# Patient Record
Sex: FEMALE | Race: WHITE
[De-identification: names, ages, dates, MRNs, and addresses within clinical notes are randomized per-mention and may not be internally consistent; named-entity substitution may affect disease eponyms.]

---

## 2017-05-16 ENCOUNTER — HOSPITAL ENCOUNTER (INPATIENT)
Dept: HOSPITAL 23 - P2L | Age: 37
LOS: 2 days | Discharge: HOME | DRG: 885 | End: 2017-05-18
Attending: PSYCHIATRY & NEUROLOGY | Admitting: PSYCHIATRY & NEUROLOGY
Payer: COMMERCIAL

## 2017-05-16 DIAGNOSIS — F40.01: ICD-10-CM

## 2017-05-16 DIAGNOSIS — R45.851: ICD-10-CM

## 2017-05-16 DIAGNOSIS — Z81.8: ICD-10-CM

## 2017-05-16 DIAGNOSIS — J45.909: ICD-10-CM

## 2017-05-16 DIAGNOSIS — F41.9: ICD-10-CM

## 2017-05-16 DIAGNOSIS — F33.2: Primary | ICD-10-CM

## 2017-05-16 DIAGNOSIS — M79.7: ICD-10-CM

## 2017-05-17 LAB
BARBITURATES UR QL SCN: 1.4 MG/DL (ref 0.2–2)
BARBITURATES UR QL SCN: 4.5 G/DL (ref 3.5–5)
BARBITURATES: (no result)
BASOPHIL#: 0 X10E3 (ref 0–0.3)
BASOPHIL%: 0.7 % (ref 0–2.5)
BENZODIAZ UR QL SCN: 18 U/L (ref 10–42)
BENZODIAZ UR QL SCN: 25 U/L (ref 10–40)
BENZODIAZEPINES: (no result)
BLOOD UREA NITROGEN: 10 MG/DL (ref 9–23)
BUN/CREATININE RATIO: 11.11
BZE UR QL SCN: 54 U/L (ref 32–92)
CALCIUM SERUM: 9.6 MG/DL (ref 8.4–10.2)
CK MB SERPL-RTO: 12.8 % (ref 11–15.5)
CK MB SERPL-RTO: 33.6 G/DL (ref 30–36)
COCAINE: (no result)
CREATININE SERUM: 0.9 MG/DL (ref 0.6–1.4)
DIFF IND: YES
DX ICD CODE: (no result)
DX ICD CODE: (no result)
EOSINOPHIL#: 0.2 X10E3 (ref 0–0.7)
EOSINOPHIL%: 3.3 % (ref 0–7)
EOSINOPHIL: 4 % (ref 0–7)
GLOM FILT RATE ESTIMATED: 82.3 ML/MIN (ref 60–?)
GLUCOSE FASTING: 84 MG/DL (ref 70–110)
HEMATOCRIT: 44.9 % (ref 35–45)
HEMOGLOBIN: 15.1 GM/DL (ref 12–16)
KETONES UR QL: 105 MMOL/L (ref 100–111)
KETONES UR QL: 23 MMOL/L (ref 22–31)
LYMPHOCYTE#: 3.8 X10E3 (ref 1–3.5)
LYMPHOCYTE%: 55.6 % (ref 17–45)
LYMPHOCYTE: 61 % (ref 17–50)
MEAN CELL VOLUME: 92.5 FL (ref 83–96)
MEAN CORPUSCULAR HEMOGLOBIN: 31.1 PG (ref 28–34)
MEAN PLATELET VOLUME: 10 FL (ref 6.5–11.5)
MONOCYTE#: 0.8 X10E3 (ref 0–1)
MONOCYTE%: 11.5 % (ref 3–12)
MONOCYTE: 13 % (ref 3–12)
NEUTROPHIL#: 2 X10E3 (ref 1.5–7.1)
NEUTROPHIL%: 28.9 % (ref 40–75)
NEUTROPHIL: 22 % (ref 40–75)
OPIATES: (no result)
PLATELET COUNT: 265 X10E3 (ref 140–420)
PLATELET ESTIMATE: NORMAL
POTASSIUM: 3.9 MMOL/L (ref 3.5–5.1)
PROTEIN TOTAL SERUM: 7 G/DL (ref 6–8.3)
RED BLOOD COUNT: 4.85 X10E (ref 3.9–5.3)
SODIUM: 140 MMOL/L (ref 135–145)
TRICYCLIC ANTIDEPRESSANTS: (no result)
U METHADONE: (no result)
URBCS1 AUWI: (no result) /[HPF] (ref 0–2)
URINE AMORPHOUS SEDIMENT: (no result)
URINE APPEARANCE: (no result)
URINE BACTERIA AUWI: (no result)
URINE BILIRUBIN: (no result)
URINE BLOOD: (no result)
URINE COLOR: YELLOW
URINE CRYSTALS: (no result) /[HPF]
URINE GLUCOSE: (no result) MG/DL
URINE KETONE: (no result)
URINE LEUKOCYTE ESTERASE: (no result)
URINE MUCUS: PRESENT
URINE NITRATE: (no result)
URINE PH: 5 (ref 5–8)
URINE PROTEIN: (no result)
URINE SOURCE: (no result)
URINE SPECIFIC GRAVITY: 1.02 (ref 1–1.03)
URINE SQUAMOUS EPITHELIAL CELL: (no result) /[HPF]
URINE UROBILINOGEN: (no result) MG/DL
UWBCS1 AUWI: (no result) (ref 0–5)
WHITE BLOOD COUNT: 6.8 X10E3 (ref 4–10.5)

## 2023-01-12 ENCOUNTER — APPOINTMENT (OUTPATIENT)
Dept: CT IMAGING | Facility: HOSPITAL | Age: 43
End: 2023-01-12
Payer: COMMERCIAL

## 2023-01-12 ENCOUNTER — HOSPITAL ENCOUNTER (EMERGENCY)
Facility: HOSPITAL | Age: 43
Discharge: LEFT BEFORE DISCHARGE | End: 2023-01-12
Attending: EMERGENCY MEDICINE | Admitting: EMERGENCY MEDICINE
Payer: COMMERCIAL

## 2023-01-12 VITALS
BODY MASS INDEX: 29.23 KG/M2 | DIASTOLIC BLOOD PRESSURE: 62 MMHG | HEIGHT: 63 IN | WEIGHT: 165 LBS | TEMPERATURE: 97.7 F | SYSTOLIC BLOOD PRESSURE: 109 MMHG | OXYGEN SATURATION: 95 % | RESPIRATION RATE: 18 BRPM | HEART RATE: 89 BPM

## 2023-01-12 DIAGNOSIS — E87.6 HYPOKALEMIA: ICD-10-CM
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
## 2023-01-12 DIAGNOSIS — M54.2 NECK PAIN, ACUTE: ICD-10-CM

## 2023-01-12 LAB
ALBUMIN SERPL-MCNC: 4.3 G/DL (ref 3.5–5.2)
ALBUMIN/GLOB SERPL: 2.2 G/DL
ALP SERPL-CCNC: 65 U/L (ref 39–117)
ALT SERPL W P-5'-P-CCNC: 13 U/L (ref 1–33)
ANION GAP SERPL CALCULATED.3IONS-SCNC: 12 MMOL/L (ref 5–15)
AST SERPL-CCNC: 13 U/L (ref 1–32)
BASOPHILS # BLD AUTO: 0.03 10*3/MM3 (ref 0–0.2)
BASOPHILS NFR BLD AUTO: 0.5 % (ref 0–1.5)
BILIRUB SERPL-MCNC: 0.8 MG/DL (ref 0–1.2)
BUN SERPL-MCNC: 12 MG/DL (ref 6–20)
BUN/CREAT SERPL: 14.3 (ref 7–25)
CALCIUM SPEC-SCNC: 8.7 MG/DL (ref 8.6–10.5)
CHLORIDE SERPL-SCNC: 108 MMOL/L (ref 98–107)
CO2 SERPL-SCNC: 20 MMOL/L (ref 22–29)
CREAT SERPL-MCNC: 0.84 MG/DL (ref 0.57–1)
DEPRECATED RDW RBC AUTO: 39 FL (ref 37–54)
EGFRCR SERPLBLD CKD-EPI 2021: 89.1 ML/MIN/1.73
EOSINOPHIL # BLD AUTO: 0.03 10*3/MM3 (ref 0–0.4)
EOSINOPHIL NFR BLD AUTO: 0.5 % (ref 0.3–6.2)
ERYTHROCYTE [DISTWIDTH] IN BLOOD BY AUTOMATED COUNT: 12.1 % (ref 12.3–15.4)
GLOBULIN UR ELPH-MCNC: 2 GM/DL
GLUCOSE SERPL-MCNC: 86 MG/DL (ref 65–99)
HCG SERPL QL: NEGATIVE
HCT VFR BLD AUTO: 36.3 % (ref 34–46.6)
HGB BLD-MCNC: 12.7 G/DL (ref 12–15.9)
IMM GRANULOCYTES # BLD AUTO: 0.02 10*3/MM3 (ref 0–0.05)
IMM GRANULOCYTES NFR BLD AUTO: 0.4 % (ref 0–0.5)
LYMPHOCYTES # BLD AUTO: 1.49 10*3/MM3 (ref 0.7–3.1)
LYMPHOCYTES NFR BLD AUTO: 26.3 % (ref 19.6–45.3)
MCH RBC QN AUTO: 30.4 PG (ref 26.6–33)
MCHC RBC AUTO-ENTMCNC: 35 G/DL (ref 31.5–35.7)
MCV RBC AUTO: 86.8 FL (ref 79–97)
MONOCYTES # BLD AUTO: 0.96 10*3/MM3 (ref 0.1–0.9)
MONOCYTES NFR BLD AUTO: 17 % (ref 5–12)
NEUTROPHILS NFR BLD AUTO: 3.13 10*3/MM3 (ref 1.7–7)
NEUTROPHILS NFR BLD AUTO: 55.3 % (ref 42.7–76)
NRBC BLD AUTO-RTO: 0 /100 WBC (ref 0–0.2)
PLATELET # BLD AUTO: 256 10*3/MM3 (ref 140–450)
PMV BLD AUTO: 10.6 FL (ref 6–12)
POTASSIUM SERPL-SCNC: 3.1 MMOL/L (ref 3.5–5.2)
PROT SERPL-MCNC: 6.3 G/DL (ref 6–8.5)
RBC # BLD AUTO: 4.18 10*6/MM3 (ref 3.77–5.28)
SODIUM SERPL-SCNC: 140 MMOL/L (ref 136–145)
WBC NRBC COR # BLD: 5.66 10*3/MM3 (ref 3.4–10.8)

## 2023-01-12 PROCEDURE — 0 IOPAMIDOL PER 1 ML: Performed by: EMERGENCY MEDICINE

## 2023-01-12 PROCEDURE — 80053 COMPREHEN METABOLIC PANEL: CPT | Performed by: PHYSICIAN ASSISTANT

## 2023-01-12 PROCEDURE — 99283 EMERGENCY DEPT VISIT LOW MDM: CPT

## 2023-01-12 PROCEDURE — 70450 CT HEAD/BRAIN W/O DYE: CPT

## 2023-01-12 PROCEDURE — 36415 COLL VENOUS BLD VENIPUNCTURE: CPT

## 2023-01-12 PROCEDURE — 84703 CHORIONIC GONADOTROPIN ASSAY: CPT | Performed by: PHYSICIAN ASSISTANT

## 2023-01-12 PROCEDURE — 70498 CT ANGIOGRAPHY NECK: CPT

## 2023-01-12 PROCEDURE — 85025 COMPLETE CBC W/AUTO DIFF WBC: CPT | Performed by: PHYSICIAN ASSISTANT

## 2023-01-12 RX ORDER — POTASSIUM CHLORIDE 750 MG/1
40 TABLET, FILM COATED, EXTENDED RELEASE ORAL ONCE
Status: DISCONTINUED | OUTPATIENT
Start: 2023-01-12 | End: 2023-01-13 | Stop reason: HOSPADM

## 2023-01-12 RX ADMIN — IOPAMIDOL 95 ML: 755 INJECTION, SOLUTION INTRAVENOUS at 21:33
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
## 2023-01-13 NOTE — NURSING NOTE
"Pt approached this RN from ED waiting room restroom at triage desk and showed both arms to indicate no IV access.  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  PINKY Rosario and MORENITA Rutherford notified of incident.  "

## 2023-01-13 NOTE — CASE MANAGEMENT/SOCIAL WORK
Spoke with patient at bedside regarding discharge plan. She states that a trusted friend is en route to hospital and she should be able to stay with him tonight. If not, she will need a place to stay and CCP can assist with finding a womens' shelter. SCOTT IsbellN RN

## 2023-01-13 NOTE — DISCHARGE INSTRUCTIONS
Although you are being discharged from the ED today, I encourage you to return for worsening symptoms. Things can, and do, change such that treatment at home with medication may not be adequate. Specifically I recommend returning for chest pain or discomfort, difficulty breathing, persistent vomiting or difficulty holding down liquids or medications, fever > 102.0 F or any other worsening or alarming symptoms.     Rest. Drink plenty of fluids.  Follow-up with your primary care provider in 1 week to have your potassium level rechecked.  Try to incorporate foods with potassium into your diet.    Follow up with primary care provider for further management and to have blood pressure rechecked.  Call [phone] to get established with a new primary care provider if you do not already have one.

## 2023-01-13 NOTE — ED NOTES
"Call received from triage nurse informing this nurse that the pt is in the lobby and has declined further treatment. Pt told PINKY Singh \"the doctor came in and gave me my results, I don't have time to stay\" IV removed by pt, PINKY Singh verifying IV's removal. CN aware. Pt did not take any of the information given to her by CCP nurse. All literature left in the room.  "

## 2024-04-04 ENCOUNTER — HOSPITAL ENCOUNTER (EMERGENCY)
Facility: HOSPITAL | Age: 44
Discharge: HOME | End: 2024-04-04
Attending: STUDENT IN AN ORGANIZED HEALTH CARE EDUCATION/TRAINING PROGRAM
Payer: COMMERCIAL

## 2024-04-04 ENCOUNTER — APPOINTMENT (OUTPATIENT)
Dept: GENERAL RADIOLOGY | Facility: HOSPITAL | Age: 44
End: 2024-04-04
Payer: COMMERCIAL

## 2024-04-04 VITALS
OXYGEN SATURATION: 99 % | TEMPERATURE: 97.6 F | HEIGHT: 63 IN | SYSTOLIC BLOOD PRESSURE: 113 MMHG | HEART RATE: 80 BPM | BODY MASS INDEX: 29.23 KG/M2 | WEIGHT: 165 LBS | RESPIRATION RATE: 16 BRPM | DIASTOLIC BLOOD PRESSURE: 73 MMHG

## 2024-04-04 DIAGNOSIS — F19.10 POLYSUBSTANCE ABUSE: ICD-10-CM

## 2024-04-04 DIAGNOSIS — Z53.21 ELOPED FROM EMERGENCY DEPARTMENT: ICD-10-CM

## 2024-04-04 DIAGNOSIS — R07.89 ATYPICAL CHEST PAIN: Primary | ICD-10-CM

## 2024-04-04 LAB
ALBUMIN SERPL-MCNC: 4.4 G/DL (ref 3.5–5.2)
ALBUMIN/GLOB SERPL: 2.1 G/DL
ALP SERPL-CCNC: 61 U/L (ref 39–117)
ALT SERPL W P-5'-P-CCNC: 11 U/L (ref 1–33)
AMPHET+METHAMPHET UR QL: POSITIVE
ANION GAP SERPL CALCULATED.3IONS-SCNC: 12.1 MMOL/L (ref 5–15)
AST SERPL-CCNC: 15 U/L (ref 1–32)
BARBITURATES UR QL SCN: NEGATIVE
BASOPHILS # BLD AUTO: 0.07 10*3/MM3 (ref 0–0.2)
BASOPHILS NFR BLD AUTO: 1.1 % (ref 0–1.5)
BENZODIAZ UR QL SCN: NEGATIVE
BILIRUB SERPL-MCNC: 1 MG/DL (ref 0–1.2)
BUN SERPL-MCNC: 14 MG/DL (ref 6–20)
BUN/CREAT SERPL: 12.7 (ref 7–25)
CALCIUM SPEC-SCNC: 8.8 MG/DL (ref 8.6–10.5)
CANNABINOIDS SERPL QL: POSITIVE
CHLORIDE SERPL-SCNC: 107 MMOL/L (ref 98–107)
CO2 SERPL-SCNC: 21.9 MMOL/L (ref 22–29)
COCAINE UR QL: NEGATIVE
CREAT SERPL-MCNC: 1.1 MG/DL (ref 0.57–1)
D DIMER PPP FEU-MCNC: <0.27 MCGFEU/ML (ref 0–0.5)
DEPRECATED RDW RBC AUTO: 41.6 FL (ref 37–54)
EGFRCR SERPLBLD CKD-EPI 2021: 64.1 ML/MIN/1.73
EOSINOPHIL # BLD AUTO: 0.11 10*3/MM3 (ref 0–0.4)
EOSINOPHIL NFR BLD AUTO: 1.7 % (ref 0.3–6.2)
ERYTHROCYTE [DISTWIDTH] IN BLOOD BY AUTOMATED COUNT: 12.2 % (ref 12.3–15.4)
FENTANYL UR-MCNC: POSITIVE NG/ML
GLOBULIN UR ELPH-MCNC: 2.1 GM/DL
GLUCOSE SERPL-MCNC: 105 MG/DL (ref 65–99)
HCG SERPL QL: NEGATIVE
HCT VFR BLD AUTO: 40.6 % (ref 34–46.6)
HGB BLD-MCNC: 13.7 G/DL (ref 12–15.9)
HOLD SPECIMEN: NORMAL
IMM GRANULOCYTES # BLD AUTO: 0.01 10*3/MM3 (ref 0–0.05)
IMM GRANULOCYTES NFR BLD AUTO: 0.2 % (ref 0–0.5)
LYMPHOCYTES # BLD AUTO: 2.47 10*3/MM3 (ref 0.7–3.1)
LYMPHOCYTES NFR BLD AUTO: 38.2 % (ref 19.6–45.3)
MAGNESIUM SERPL-MCNC: 2 MG/DL (ref 1.6–2.6)
MCH RBC QN AUTO: 31.4 PG (ref 26.6–33)
MCHC RBC AUTO-ENTMCNC: 33.7 G/DL (ref 31.5–35.7)
MCV RBC AUTO: 92.9 FL (ref 79–97)
METHADONE UR QL SCN: NEGATIVE
MONOCYTES # BLD AUTO: 0.76 10*3/MM3 (ref 0.1–0.9)
MONOCYTES NFR BLD AUTO: 11.8 % (ref 5–12)
NEUTROPHILS NFR BLD AUTO: 3.04 10*3/MM3 (ref 1.7–7)
NEUTROPHILS NFR BLD AUTO: 47 % (ref 42.7–76)
NRBC BLD AUTO-RTO: 0 /100 WBC (ref 0–0.2)
NT-PROBNP SERPL-MCNC: 129 PG/ML (ref 0–450)
OPIATES UR QL: NEGATIVE
OXYCODONE UR QL SCN: NEGATIVE
PLATELET # BLD AUTO: 313 10*3/MM3 (ref 140–450)
PMV BLD AUTO: 10.9 FL (ref 6–12)
POTASSIUM SERPL-SCNC: 3.4 MMOL/L (ref 3.5–5.2)
PROT SERPL-MCNC: 6.5 G/DL (ref 6–8.5)
QT INTERVAL: 329 MS
QTC INTERVAL: 425 MS
RBC # BLD AUTO: 4.37 10*6/MM3 (ref 3.77–5.28)
SODIUM SERPL-SCNC: 141 MMOL/L (ref 136–145)
TROPONIN T SERPL HS-MCNC: <6 NG/L
WBC NRBC COR # BLD AUTO: 6.46 10*3/MM3 (ref 3.4–10.8)
WHOLE BLOOD HOLD COAG: NORMAL
WHOLE BLOOD HOLD SPECIMEN: NORMAL

## 2024-04-04 PROCEDURE — 85379 FIBRIN DEGRADATION QUANT: CPT | Performed by: PHYSICIAN ASSISTANT

## 2024-04-04 PROCEDURE — 80307 DRUG TEST PRSMV CHEM ANLYZR: CPT | Performed by: PHYSICIAN ASSISTANT

## 2024-04-04 PROCEDURE — 93010 ELECTROCARDIOGRAM REPORT: CPT | Performed by: INTERNAL MEDICINE

## 2024-04-04 PROCEDURE — 96374 THER/PROPH/DIAG INJ IV PUSH: CPT

## 2024-04-04 PROCEDURE — 25810000003 SODIUM CHLORIDE 0.9 % SOLUTION: Performed by: PHYSICIAN ASSISTANT

## 2024-04-04 PROCEDURE — 99284 EMERGENCY DEPT VISIT MOD MDM: CPT

## 2024-04-04 PROCEDURE — 84703 CHORIONIC GONADOTROPIN ASSAY: CPT | Performed by: PHYSICIAN ASSISTANT

## 2024-04-04 PROCEDURE — 71045 X-RAY EXAM CHEST 1 VIEW: CPT

## 2024-04-04 PROCEDURE — 80053 COMPREHEN METABOLIC PANEL: CPT

## 2024-04-04 PROCEDURE — 85025 COMPLETE CBC W/AUTO DIFF WBC: CPT

## 2024-04-04 PROCEDURE — 84484 ASSAY OF TROPONIN QUANT: CPT

## 2024-04-04 PROCEDURE — 83880 ASSAY OF NATRIURETIC PEPTIDE: CPT | Performed by: PHYSICIAN ASSISTANT

## 2024-04-04 PROCEDURE — 96361 HYDRATE IV INFUSION ADD-ON: CPT

## 2024-04-04 PROCEDURE — 93005 ELECTROCARDIOGRAM TRACING: CPT

## 2024-04-04 PROCEDURE — 83735 ASSAY OF MAGNESIUM: CPT | Performed by: PHYSICIAN ASSISTANT

## 2024-04-04 RX ORDER — BUPROPION HYDROCHLORIDE 150 MG/1
150 TABLET, EXTENDED RELEASE ORAL 2 TIMES DAILY
COMMUNITY

## 2024-04-04 RX ORDER — FAMOTIDINE 10 MG/ML
20 INJECTION, SOLUTION INTRAVENOUS ONCE
Status: COMPLETED | OUTPATIENT
Start: 2024-04-04 | End: 2024-04-04

## 2024-04-04 RX ORDER — BUSPIRONE HYDROCHLORIDE 10 MG/1
10 TABLET ORAL 3 TIMES DAILY
COMMUNITY

## 2024-04-04 RX ORDER — PRAZOSIN HYDROCHLORIDE 1 MG/1
CAPSULE ORAL NIGHTLY
COMMUNITY

## 2024-04-04 RX ORDER — DEXTROAMPHETAMINE SACCHARATE, AMPHETAMINE ASPARTATE, DEXTROAMPHETAMINE SULFATE AND AMPHETAMINE SULFATE 5; 5; 5; 5 MG/1; MG/1; MG/1; MG/1
20 TABLET ORAL DAILY
COMMUNITY

## 2024-04-04 RX ORDER — SODIUM CHLORIDE 0.9 % (FLUSH) 0.9 %
10 SYRINGE (ML) INJECTION AS NEEDED
Status: DISCONTINUED | OUTPATIENT
Start: 2024-04-04 | End: 2024-04-04 | Stop reason: HOSPADM

## 2024-04-04 RX ORDER — DEXTROAMPHETAMINE SACCHARATE, AMPHETAMINE ASPARTATE MONOHYDRATE, DEXTROAMPHETAMINE SULFATE AND AMPHETAMINE SULFATE 5; 5; 5; 5 MG/1; MG/1; MG/1; MG/1
25 CAPSULE, EXTENDED RELEASE ORAL EVERY MORNING
COMMUNITY

## 2024-04-04 RX ORDER — ASPIRIN 325 MG
325 TABLET ORAL ONCE
Status: DISCONTINUED | OUTPATIENT
Start: 2024-04-04 | End: 2024-04-04

## 2024-04-04 RX ADMIN — SODIUM CHLORIDE 500 ML: 9 INJECTION, SOLUTION INTRAVENOUS at 08:53

## 2024-04-04 RX ADMIN — FAMOTIDINE 20 MG: 10 INJECTION INTRAVENOUS at 07:46

## 2024-04-04 NOTE — ED PROVIDER NOTES
EMERGENCY DEPARTMENT MD ATTESTATION NOTE    Room Number:  03/03  PCP: Shannon Lorenzana MD  Independent Historians: Patient    HPI:    Context: Alicia Haro is a 43 y.o. female with a medical history of bipolar, substance abuse, fibromyalgia who presents to the ED c/o acute chest discomfort.    Patient absconded from the emergency department prior to my evaluation.  I reviewed documentation and discussed patient's case with TORSTEN Lujan.    Patient presented for concerns of possible pregnancy as well as left-sided chest discomfort for 3 days duration.  Patient without significant cardiac history.        Review of prior external notes (non-ED) -and- Review of prior external test results outside of this encounter: Presents with internal medicine from 8/2/2023 reviewed and notable for presentation secondary to pelvic pain and right lower quadrant pain.  Plan was to obtain pelvic ultrasound, CBC, CMP, urinalysis, urine culture, vaginosis panel and recommend to follow-up with gynecology.      PHYSICAL EXAM    I have reviewed the triage vital signs and nursing notes.    ED Triage Vitals   Temp Heart Rate Resp BP SpO2   04/04/24 0801 04/04/24 0643 04/04/24 0643 04/04/24 0650 04/04/24 0643   97.6 °F (36.4 °C) (!) 133 16 133/83 99 %      Temp src Heart Rate Source Patient Position BP Location FiO2 (%)   04/04/24 0643 04/04/24 0643 -- -- --   Tympanic Monitor        MEDICATIONS GIVEN IN ER  Medications   famotidine (PEPCID) injection 20 mg (20 mg Intravenous Given 4/4/24 0746)   sodium chloride 0.9 % bolus 500 mL (0 mL Intravenous Stopped 4/4/24 1012)         ORDERS PLACED DURING THIS VISIT:  Orders Placed This Encounter   Procedures    XR Chest 1 View    [place] Draw    Comprehensive Metabolic Panel    High Sensitivity Troponin T    CBC Auto Differential    BNP    Magnesium    Urine Drug Screen - Urine, Clean Catch    D-dimer, Quantitative    hCG, Serum, Qualitative    ECG 12 Lead ED Triage Standing Order; Chest Pain     CBC & Differential    Green Top (Gel)    Lavender Top    Light Blue Top       PROGRESS, DATA ANALYSIS, CONSULTS, AND MEDICAL DECISION MAKING  All labs have been independently interpreted by me.  All radiology studies have been reviewed by me. All EKG's have been independently viewed and interpreted by me.  Discussion below represents my analysis of pertinent findings related to patient's condition, differential diagnosis, treatment plan and final disposition.    Differential diagnosis includes but is not limited to ACS, intoxication, pregnancy.    Clinical Scores:                   ED Course as of 04/04/24 1615   Thu Apr 04, 2024   0737 WBC: 6.46 [DC]   0737 Per my independent interpretation of the chest x-ray, there is no obvious pneumothorax. [DC]   0756 D-Dimer, Quant: <0.27 [DC]   0823 HS Troponin T: <6 [DC]   0917 Amphet/Methamphet, Screen(!): Positive [DC]   0917 THC Screen, Urine(!): Positive [DC]   1007 I been notified that the patient has not been seen for several minutes and is not in her room.  All of her belongings were gone.  Presumed elopement. [DC]   1615 EKG interpreted by me demonstrates sinus rhythm, rate of 100, no VA/QT prolongation, no ST elevation but diffuse nonspecific ST changes are noted. [MW]      ED Course User Index  [DC] Tigre Oneil PA  [MW] Jose Grant MD       MDM: 43-year-old female presented for evaluation of left-sided chest pain and concerns for pregnancy.  EKG reassuring.  Troponin and D-dimer negative.  Pregnancy test negative.  Prior to completion of laboratory evaluation and prior to my face-to-face evaluation patient absconded from the emergency department.      COMPLEXITY OF CARE  Admission was considered but after careful review of the patient's presentation, physical examination, diagnostic results, and response to treatment the patient may be safely discharged with outpatient follow-up.    Please note that portions of this document were completed with a voice  recognition program.    Note Disclaimer: At [facility], we believe that sharing information builds trust and better relationships. You are receiving this note because you recently visited [facility]. It is possible you will see health information before a provider has talked with you about it. This kind of information can be easy to misunderstand. To help you fully understand what it means for your health, we urge you to discuss this note with your provider.         Jose Grant MD  04/04/24 2274

## 2024-04-04 NOTE — ED PROVIDER NOTES
EMERGENCY DEPARTMENT ENCOUNTER      Room Number:  03/03  PCP: Shannon Lorenzana MD  Independent Historians: Patient  Patient Care Team:  Shannon Lorenzana MD as PCP - General       HPI:  Chief Complaint: Chest pain    A complete HPI/ROS/PMH/PSH/SH/FH are unobtainable due to: None    Chronic or social conditions impacting patient care (Social Determinants of Health): None      Context: Alicia Haro is a 43 y.o. female with a PMH significant for bipolar affective disorder, substance abuse, fibromyalgia, anxiety who presents to the ED c/o acute left-sided chest discomfort.  She reports that symptoms began 3 days ago and have been intermittent without any obvious provocative or palliative activity.  She reports that today she started feeling some mild dyspnea which is what brought her to the ER.  She also discloses concern for pregnancy and states that her last period was 2/17/2024.  Reports that she has noticed some edema in her ankles as well as increased indigestion and nausea.      Upon review of prior external notes (non-ED) -and- Review of prior external test results outside of this encounter it appears the patient was evaluated in the office with internal medicine for abdominal pain on 8/2/2023.  The patient had a normal urinalysis on 3/19/2024 and a normal troponin on 10/22/2023.      PAST MEDICAL HISTORY  Active Ambulatory Problems     Diagnosis Date Noted    No Active Ambulatory Problems     Resolved Ambulatory Problems     Diagnosis Date Noted    No Resolved Ambulatory Problems     Past Medical History:   Diagnosis Date    ADHD     Anxiety     Asthma     Bipolar 2 disorder     Depression     IBS (irritable colon syndrome)     PCOS (polycystic ovarian syndrome)          PAST SURGICAL HISTORY  History reviewed. No pertinent surgical history.      FAMILY HISTORY  Family History   Problem Relation Age of Onset    Thyroid disease Mother     Heart failure Mother     Diabetes Mother     Aneurysm Mother     Hypertension  Father     Diabetes Father          SOCIAL HISTORY  Social History     Socioeconomic History    Marital status: Single   Tobacco Use    Smoking status: Every Day     Current packs/day: 1.00     Types: Cigarettes   Substance and Sexual Activity    Alcohol use: Never    Drug use: Yes     Frequency: 2.0 times per week     Types: Marijuana    Sexual activity: Defer         ALLERGIES  Amoxicillin-pot clavulanate and Latex      REVIEW OF SYSTEMS  Included in HPI  All systems reviewed and negative except for those discussed in HPI.      PHYSICAL EXAM    I have reviewed the triage vital signs and nursing notes.    ED Triage Vitals   Temp Heart Rate Resp BP SpO2   -- 04/04/24 0643 04/04/24 0643 04/04/24 0650 04/04/24 0643    (!) 133 16 133/83 99 %      Temp src Heart Rate Source Patient Position BP Location FiO2 (%)   04/04/24 0643 04/04/24 0643 -- -- --   Tympanic Monitor          Physical Exam  Constitutional:       General: She is not in acute distress.     Appearance: She is well-developed.   HENT:      Head: Normocephalic and atraumatic.   Eyes:      General: No scleral icterus.     Conjunctiva/sclera: Conjunctivae normal.   Neck:      Trachea: No tracheal deviation.   Cardiovascular:      Rate and Rhythm: Regular rhythm. Tachycardia present.   Pulmonary:      Effort: Pulmonary effort is normal.      Breath sounds: Normal breath sounds.   Abdominal:      Palpations: Abdomen is soft.      Tenderness: There is no abdominal tenderness.   Musculoskeletal:         General: No deformity.      Cervical back: Normal range of motion.   Lymphadenopathy:      Cervical: No cervical adenopathy.   Skin:     General: Skin is warm and dry.   Neurological:      Mental Status: She is alert and oriented to person, place, and time.   Psychiatric:         Behavior: Behavior normal.         Vital signs and nursing notes reviewed.      PPE: I wore a surgical mask throughout my encounters with the pt. I performed hand hygiene on entry into the  pt room and upon exit.      LAB RESULTS  Recent Results (from the past 24 hour(s))   ECG 12 Lead ED Triage Standing Order; Chest Pain    Collection Time: 04/04/24  6:48 AM   Result Value Ref Range    QT Interval 329 ms    QTC Interval 425 ms   Comprehensive Metabolic Panel    Collection Time: 04/04/24  7:16 AM    Specimen: Blood   Result Value Ref Range    Glucose 105 (H) 65 - 99 mg/dL    BUN 14 6 - 20 mg/dL    Creatinine 1.10 (H) 0.57 - 1.00 mg/dL    Sodium 141 136 - 145 mmol/L    Potassium 3.4 (L) 3.5 - 5.2 mmol/L    Chloride 107 98 - 107 mmol/L    CO2 21.9 (L) 22.0 - 29.0 mmol/L    Calcium 8.8 8.6 - 10.5 mg/dL    Total Protein 6.5 6.0 - 8.5 g/dL    Albumin 4.4 3.5 - 5.2 g/dL    ALT (SGPT) 11 1 - 33 U/L    AST (SGOT) 15 1 - 32 U/L    Alkaline Phosphatase 61 39 - 117 U/L    Total Bilirubin 1.0 0.0 - 1.2 mg/dL    Globulin 2.1 gm/dL    A/G Ratio 2.1 g/dL    BUN/Creatinine Ratio 12.7 7.0 - 25.0    Anion Gap 12.1 5.0 - 15.0 mmol/L    eGFR 64.1 >60.0 mL/min/1.73   High Sensitivity Troponin T    Collection Time: 04/04/24  7:16 AM    Specimen: Blood   Result Value Ref Range    HS Troponin T <6 <14 ng/L   Green Top (Gel)    Collection Time: 04/04/24  7:16 AM   Result Value Ref Range    Extra Tube Hold for add-ons.    Lavender Top    Collection Time: 04/04/24  7:16 AM   Result Value Ref Range    Extra Tube hold for add-on    Light Blue Top    Collection Time: 04/04/24  7:16 AM   Result Value Ref Range    Extra Tube Hold for add-ons.    CBC Auto Differential    Collection Time: 04/04/24  7:16 AM    Specimen: Blood   Result Value Ref Range    WBC 6.46 3.40 - 10.80 10*3/mm3    RBC 4.37 3.77 - 5.28 10*6/mm3    Hemoglobin 13.7 12.0 - 15.9 g/dL    Hematocrit 40.6 34.0 - 46.6 %    MCV 92.9 79.0 - 97.0 fL    MCH 31.4 26.6 - 33.0 pg    MCHC 33.7 31.5 - 35.7 g/dL    RDW 12.2 (L) 12.3 - 15.4 %    RDW-SD 41.6 37.0 - 54.0 fl    MPV 10.9 6.0 - 12.0 fL    Platelets 313 140 - 450 10*3/mm3    Neutrophil % 47.0 42.7 - 76.0 %    Lymphocyte %  38.2 19.6 - 45.3 %    Monocyte % 11.8 5.0 - 12.0 %    Eosinophil % 1.7 0.3 - 6.2 %    Basophil % 1.1 0.0 - 1.5 %    Immature Grans % 0.2 0.0 - 0.5 %    Neutrophils, Absolute 3.04 1.70 - 7.00 10*3/mm3    Lymphocytes, Absolute 2.47 0.70 - 3.10 10*3/mm3    Monocytes, Absolute 0.76 0.10 - 0.90 10*3/mm3    Eosinophils, Absolute 0.11 0.00 - 0.40 10*3/mm3    Basophils, Absolute 0.07 0.00 - 0.20 10*3/mm3    Immature Grans, Absolute 0.01 0.00 - 0.05 10*3/mm3    nRBC 0.0 0.0 - 0.2 /100 WBC   BNP    Collection Time: 04/04/24  7:16 AM    Specimen: Blood   Result Value Ref Range    proBNP 129.0 0.0 - 450.0 pg/mL   Magnesium    Collection Time: 04/04/24  7:16 AM    Specimen: Blood   Result Value Ref Range    Magnesium 2.0 1.6 - 2.6 mg/dL   D-dimer, Quantitative    Collection Time: 04/04/24  7:16 AM    Specimen: Blood   Result Value Ref Range    D-Dimer, Quantitative <0.27 0.00 - 0.50 MCGFEU/mL   hCG, Serum, Qualitative    Collection Time: 04/04/24  7:16 AM    Specimen: Blood   Result Value Ref Range    HCG Qualitative Negative Negative   Urine Drug Screen - Urine, Clean Catch    Collection Time: 04/04/24  8:20 AM    Specimen: Urine, Clean Catch   Result Value Ref Range    Amphet/Methamphet, Screen Positive (A) Negative    Barbiturates Screen, Urine Negative Negative    Benzodiazepine Screen, Urine Negative Negative    Cocaine Screen, Urine Negative Negative    Opiate Screen Negative Negative    THC, Screen, Urine Positive (A) Negative    Methadone Screen, Urine Negative Negative    Oxycodone Screen, Urine Negative Negative    Fentanyl, Urine Positive (A) Negative         RADIOLOGY  XR Chest 1 View    Result Date: 4/4/2024  XR CHEST 1 VW-  HISTORY: Chest pain.  COMPARISON: Chest radiograph dated 2/2/2015  FINDINGS: A single view of the chest was obtained.  Support Devices:  None. Cardiac Silhouette/Mediastinum/Nehal:  The cardiac, mediastinal, and hilar contours are within normal limits. Lungs/Pleural Spaces:  The lungs and pleural  spaces are clear. Chest Wall/Diaphragm/Upper Abdomen:  The thoracic musculoskeletal structures and the upper abdomen are within normal limits.   CONCLUSION(S):   1.  No focal consolidation or effusion.  This report was finalized on 4/4/2024 7:32 AM by Dr. Jayde Delacruz M.D on Workstation: [id number]         MEDICATIONS GIVEN IN ER  Medications   famotidine (PEPCID) injection 20 mg (20 mg Intravenous Given 4/4/24 0746)   sodium chloride 0.9 % bolus 500 mL (0 mL Intravenous Stopped 4/4/24 1012)         ORDERS PLACED DURING THIS VISIT:  Orders Placed This Encounter   Procedures    XR Chest 1 View    Isabella Draw    Comprehensive Metabolic Panel    High Sensitivity Troponin T    CBC Auto Differential    BNP    Magnesium    Urine Drug Screen - Urine, Clean Catch    D-dimer, Quantitative    hCG, Serum, Qualitative    ECG 12 Lead ED Triage Standing Order; Chest Pain    CBC & Differential    Green Top (Gel)    Lavender Top    Light Blue Top         OUTPATIENT MEDICATION MANAGEMENT:  No current Epic-ordered facility-administered medications on file.     Current Outpatient Medications Ordered in Epic   Medication Sig Dispense Refill    amphetamine-dextroamphetamine (ADDERALL) 20 MG tablet Take 1 tablet by mouth Daily.      amphetamine-dextroamphetamine XR (Adderall XR) 20 MG 24 hr capsule Take 25 mg by mouth Every Morning      buPROPion SR (WELLBUTRIN SR) 150 MG 12 hr tablet Take 1 tablet by mouth 2 (Two) Times a Day.      busPIRone (BUSPAR) 10 MG tablet Take 1 tablet by mouth 3 (Three) Times a Day.      prazosin (MINIPRESS) 1 MG capsule Take  by mouth Every Night. Unknown dose             PROGRESS, DATA ANALYSIS, CONSULTS, AND MEDICAL DECISION MAKING  All labs have been independently interpreted by me.  All radiology studies have been reviewed by me. All EKG's have been independently viewed and interpreted by me.  Discussion below represents my analysis of pertinent findings related to patient's condition, differential  diagnosis, treatment plan and final disposition.    Patient presentation and evaluation most consistent with atypical sounding chest pain from unclear etiology.  Her primary concern was for pregnancy which was ruled out during this visit as well.  She unfortunately eloped prior to my discussion with her about her workup and my encouragement for close follow-up.    DIFFERENTIAL DIAGNOSIS INCLUDE BUT NOT LIMITED TO:     Differential diagnosis includes but is not limited to:  -acute coronary syndrome  -pulmonary embolism  -thoracic aortic dissection  -pneumonia  -pneumothorax  -musculoskeletal pain  -GERD  -esophageal spasm  -anxiety  -myocarditis/pericarditis  -esophageal rupture  -pancreatitis.       Clinical Scores: N/A      ED Course as of 04/05/24 0554   Thu Apr 04, 2024   0737 WBC: 6.46 [DC]   0737 Per my independent interpretation of the chest x-ray, there is no obvious pneumothorax. [DC]   0756 D-Dimer, Quant: <0.27 [DC]   0823 HS Troponin T: <6 [DC]   0917 Amphet/Methamphet, Screen(!): Positive [DC]   0917 THC Screen, Urine(!): Positive [DC]   1007 I been notified that the patient has not been seen for several minutes and is not in her room.  All of her belongings were gone.  Presumed elopement. [DC]   1615 EKG interpreted by me demonstrates sinus rhythm, rate of 100, no CT/QT prolongation, no ST elevation but diffuse nonspecific ST changes are noted. [MW]      ED Course User Index  [DC] Tigre Oneil PA  [MW] Jose Grant MD             AS OF 05:54 EDT VITALS:    BP - 113/73  HR - 80  TEMP - 97.6 °F (36.4 °C) (Oral)  O2 SATS - 99%    COMPLEXITY OF CARE  Admission was considered but after careful review of the patient's presentation, physical examination, diagnostic results, and response to treatment the patient may be safely discharged with outpatient follow-up.      DIAGNOSIS  Final diagnoses:   Atypical chest pain   Polysubstance abuse   Eloped from emergency department         DISPOSITION  ED  Disposition       ED Disposition   Eloped    Condition   --    Comment   --                Please note that portions of this document were completed with a voice recognition program.    Note Disclaimer: At [facility], we believe that sharing information builds trust and better relationships. You are receiving this note because you recently visited [facility]. It is possible you will see health information before a provider has talked with you about it. This kind of information can be easy to misunderstand. To help you fully understand what it means for your health, we urge you to discuss this note with your provider.         Tigre Oneil PA  04/05/24 0554

## 2024-04-04 NOTE — ED NOTES
Pt arrives to ED via PV from home, c/o intermittent chest pain for the last 3 days, today is affecting breathing and blurry vision. Pt is also unsure if she is pregnant as she was having sx. Pt has not taken a pregnancy test, last period was 2/17/2024.

## 2024-04-04 NOTE — ED NOTES
This RN went to check on pt in room and pt was not to be found. Pt had taken her gown off and took her monitor off. Attempted to contact pt via the phone number in the computer and that number is not in service. The pt did have an IV in place and the IV was not in the room. Provider notified, charge RN notified.

## 2024-10-28 ENCOUNTER — OFFICE VISIT (OUTPATIENT)
Dept: INTERNAL MEDICINE | Facility: CLINIC | Age: 44
End: 2024-10-28
Payer: COMMERCIAL

## 2024-10-28 ENCOUNTER — HOSPITAL ENCOUNTER (OUTPATIENT)
Facility: HOSPITAL | Age: 44
Discharge: HOME | End: 2024-10-28
Payer: COMMERCIAL

## 2024-10-28 VITALS
OXYGEN SATURATION: 98 % | SYSTOLIC BLOOD PRESSURE: 118 MMHG | WEIGHT: 161 LBS | TEMPERATURE: 98.3 F | BODY MASS INDEX: 28.53 KG/M2 | DIASTOLIC BLOOD PRESSURE: 72 MMHG | HEIGHT: 63 IN | HEART RATE: 91 BPM

## 2024-10-28 DIAGNOSIS — R94.31 ABNORMAL EKG: ICD-10-CM

## 2024-10-28 DIAGNOSIS — R73.9 ELEVATED RANDOM BLOOD GLUCOSE LEVEL: ICD-10-CM

## 2024-10-28 DIAGNOSIS — E55.9 VITAMIN D DEFICIENCY: Chronic | ICD-10-CM

## 2024-10-28 DIAGNOSIS — F32.A ANXIETY AND DEPRESSION: Chronic | ICD-10-CM

## 2024-10-28 DIAGNOSIS — R07.9 CHEST PAIN, UNSPECIFIED TYPE: ICD-10-CM

## 2024-10-28 DIAGNOSIS — G47.00 INSOMNIA, UNSPECIFIED TYPE: Chronic | ICD-10-CM

## 2024-10-28 DIAGNOSIS — F31.81 BIPOLAR 2 DISORDER: Chronic | ICD-10-CM

## 2024-10-28 DIAGNOSIS — F41.9 ANXIETY AND DEPRESSION: Chronic | ICD-10-CM

## 2024-10-28 DIAGNOSIS — L65.9 THINNING HAIR: ICD-10-CM

## 2024-10-28 DIAGNOSIS — L68.0 HIRSUTISM: Chronic | ICD-10-CM

## 2024-10-28 DIAGNOSIS — Z76.89 ENCOUNTER TO ESTABLISH CARE: Primary | ICD-10-CM

## 2024-10-28 DIAGNOSIS — M25.532 LEFT WRIST PAIN: ICD-10-CM

## 2024-10-28 DIAGNOSIS — D72.819 LEUKOPENIA, UNSPECIFIED TYPE: ICD-10-CM

## 2024-10-28 DIAGNOSIS — H04.123 DRY EYES: ICD-10-CM

## 2024-10-28 DIAGNOSIS — M25.50 ARTHRALGIA, UNSPECIFIED JOINT: ICD-10-CM

## 2024-10-28 DIAGNOSIS — R21 FACIAL RASH: ICD-10-CM

## 2024-10-28 DIAGNOSIS — E53.8 B12 DEFICIENCY: ICD-10-CM

## 2024-10-28 DIAGNOSIS — R53.82 CHRONIC FATIGUE, UNSPECIFIED: ICD-10-CM

## 2024-10-28 PROBLEM — N30.10 CHRONIC INTERSTITIAL CYSTITIS: Status: ACTIVE | Noted: 2021-03-29

## 2024-10-28 PROBLEM — K21.9 GASTROESOPHAGEAL REFLUX DISEASE: Status: ACTIVE | Noted: 2021-03-29

## 2024-10-28 PROCEDURE — 99204 OFFICE O/P NEW MOD 45 MIN: CPT

## 2024-10-28 PROCEDURE — 1125F AMNT PAIN NOTED PAIN PRSNT: CPT

## 2024-10-28 RX ORDER — IBUPROFEN 800 MG/1
TABLET, FILM COATED ORAL
COMMUNITY
Start: 2024-08-30

## 2024-10-28 RX ORDER — LAMOTRIGINE 25 MG/1
TABLET ORAL
COMMUNITY
Start: 2024-10-08

## 2024-10-28 NOTE — PROGRESS NOTES
Chief Complaint  Establish Care, Fatigue, Joint Pain, Chest Pain, Anxiety, Depression, and Wrist Pain     Subjective:      History of Present Illness {CC  Problem List  Visit  Diagnosis   Encounters  Notes  Medications  Labs  Result Review Imaging  Media :23}     Alicia Haro presents to [facility] for:      History of Present Illness     Pt is new to me today and is here to establish care.   Last PCP: Dr. Read and SEGUNDO Short  Pt was last seen in August 2024.     Pt has a hx of fibromyalgia- pt states that she was dx with this from Dr. Read. Pt has a long history of autoimmune symptoms, but states that she has never been fully tested. Pt states she has generalized joint pain, dry eyes, chronic fatigue, ,migraines, thinning hair, dry scalp and facial rash. Pt also states she has random episodes of swelling, excessive sweating, and tremors.     Peripheral neuropathy- pt states thaty she was diagnosed with this years ago and is unsure of how it started.       Bipolar/ Anxiety/ Depression: pt sees Anushka cantor at [facility]. Pt is currently on lamictal, buspar, wellbutrin, lamictal, prazosin and adderral. Adderrall is prescribed by the couch.       P broke her left wrist years ago and has been having trouble with turning this. Yesterday she hit her wrist against a doorframe and heard a pop and it has been painful and swollen since. \    Patient has a history of chest pain.  Patient states this comes intermittently, but last for a while.  Patient has been worked up on this by her last provider, and states her EKG was abnormal.  Patient states she was supposed to be sent to cardiology, but never got the call for a referral.    Patient was referred to hematology for low white blood cell counts.  Patient has an appointment in a few weeks.  Patient is going to [place] hematology.    I have reviewed patient's medical history, any new submitted information  "provided by patient or medical assistant and updated medical record.      Objective:      Physical Exam  Vitals reviewed.   Constitutional:       Appearance: Normal appearance.   HENT:      Head: Normocephalic.   Cardiovascular:      Rate and Rhythm: Normal rate and regular rhythm.      Pulses: Normal pulses.      Heart sounds: Normal heart sounds. No murmur heard.     No friction rub.   Pulmonary:      Effort: Pulmonary effort is normal.      Breath sounds: Normal breath sounds.   Musculoskeletal:      Left wrist: Swelling and tenderness present. Decreased range of motion.   Skin:     General: Skin is warm and dry.      Capillary Refill: Capillary refill takes less than 2 seconds.   Neurological:      General: No focal deficit present.      Mental Status: She is alert.   Psychiatric:         Mood and Affect: Mood normal.         Behavior: Behavior normal.        Result Review  Data Reviewed:{ Labs  Result Review  Imaging  Med Tab  Media :23}                Vital Signs:   /72 (BP Location: Left arm, Patient Position: Sitting, Cuff Size: Adult)   Pulse 91   Temp 98.3 °F (36.8 °C) (Oral)   Ht 160 cm (63\")   Wt 73 kg (161 lb)   SpO2 98%   BMI 28.52 kg/m²   Estimated body mass index is 28.52 kg/m² as calculated from the following:    Height as of this encounter: 160 cm (63\").    Weight as of this encounter: 73 kg (161 lb).        Requested Prescriptions      No prescriptions requested or ordered in this encounter       Routine medications provided by this office will also be refilled via pharmacy request.       Current Outpatient Medications:     amphetamine-dextroamphetamine (ADDERALL) 20 MG tablet, Take 1 tablet by mouth Daily., Disp: , Rfl:     amphetamine-dextroamphetamine XR (Adderall XR) 20 MG 24 hr capsule, Take 25 mg by mouth Every Morning, Disp: , Rfl:     buPROPion SR (WELLBUTRIN SR) 150 MG 12 hr tablet, Take 1 tablet by mouth 2 (Two) Times a Day., Disp: , Rfl:     busPIRone (BUSPAR) 10 MG " tablet, Take 1 tablet by mouth 3 (Three) Times a Day., Disp: , Rfl:     ibuprofen (ADVIL,MOTRIN) 800 MG tablet, , Disp: , Rfl:     lamoTRIgine (LaMICtal) 25 MG tablet, , Disp: , Rfl:     prazosin (MINIPRESS) 1 MG capsule, Take  by mouth Every Night. Unknown dose, Disp: , Rfl:      Assessment and Plan:      Assessment and Plan {CC Problem List  Visit Diagnosis  ROS  Review (Popup)  [facility]  Quality  BestPractice  Medications  SmartSets  SnapShot Encounters  Media :23}     Diagnoses and all orders for this visit:    1. Encounter to establish care (Primary)    2. B12 deficiency  -     TSH Rfx On Abnormal To Free T4  -     [facility] Comprehensive Panel  -     Sedimentation Rate  -     C-reactive protein  -     Sjogren's Antibody, Anti-SS-A / -SS-B  -     Vitamin B12    3. Chronic fatigue, unspecified  -     TSH Rfx On Abnormal To Free T4  -     [facility] Comprehensive Panel  -     Sedimentation Rate  -     C-reactive protein  -     Sjogren's Antibody, Anti-SS-A / -SS-B    4. Anxiety and depression  -     TSH Rfx On Abnormal To Free T4    5. Vitamin D deficiency  -     Vitamin D 25 hydroxy    6. Hirsutism  -     TSH Rfx On Abnormal To Free T4  -     [facility] Comprehensive Panel  -     Sedimentation Rate  -     C-reactive protein  -     Sjogren's Antibody, Anti-SS-A / -SS-B    7. Arthralgia, unspecified joint  -     TSH Rfx On Abnormal To Free T4  -     [facility] Comprehensive Panel  -     Sedimentation Rate  -     C-reactive protein  -     Sjogren's Antibody, Anti-SS-A / -SS-B    8. Dry eyes  -     TSH Rfx On Abnormal To Free T4  -     [facility] Comprehensive Panel  -     Sedimentation Rate  -     C-reactive protein  -     Sjogren's Antibody, Anti-SS-A / -SS-B    9. Facial rash  -     TSH Rfx On Abnormal To Free T4  -     [facility] Comprehensive Panel  -     Sedimentation Rate  -     C-reactive protein  -     Sjogren's Antibody, Anti-SS-A / -SS-B    10. Thinning hair  -     TSH Rfx On Abnormal To Free T4  -     [facility] Comprehensive  Panel  -     Sedimentation Rate  -     C-reactive protein  -     Sjogren's Antibody, Anti-SS-A / -SS-B    11. Elevated random blood glucose level  -     Comprehensive Metabolic Panel  -     Hemoglobin A1c    12. Leukopenia, unspecified type  -     CBC & Differential    13. Left wrist pain  -     XR Wrist 3+ View Left; Future    14. Chest pain, unspecified type  -     Ambulatory Referral to Cardiology    15. Abnormal EKG  -     Ambulatory Referral to Cardiology    16. Bipolar 2 disorder    17. Insomnia, unspecified type             No orders of the defined types were placed in this encounter.      Will get labs today and review with patient once available.  Educated patient to follow-up with hematology as previously indicated.  If all labs are normal we will still consider sending patient to rheumatology.  Educated patient to continue seeing mental health provider.  Will get x-ray of her left wrist and review results with patient once available.  Will potentially send patient to Ortho or hand surgery if needed.  Educated patient to maintain a healthy diet and daily exercise.  Patient verbalized understanding and is comfortable with the plan of care.    Follow Up {Instructions Charge Capture  Follow-up Communications :23}     Return in about 4 weeks (around 11/25/2024) for Annual physical.      Patient was given instructions and counseling regarding her condition or for health maintenance advice. Please see specific information pulled into the AVS if appropriate.    Elaina disclaimer:   Much of this encounter note is an electronic transcription/translation of spoken language to printed text. The electronic translation of spoken language may permit erroneous, or at times, nonsensical words or phrases to be inadvertently transcribed; Although I have reviewed the note for such errors, some may still exist.     Additional Patient Counseling:       There are no Patient Instructions on file for this visit.

## 2024-10-29 ENCOUNTER — TELEPHONE (OUTPATIENT)
Dept: INTERNAL MEDICINE | Facility: CLINIC | Age: 44
End: 2024-10-29
Payer: COMMERCIAL

## 2024-10-29 DIAGNOSIS — M25.50 ARTHRALGIA, UNSPECIFIED JOINT: ICD-10-CM

## 2024-10-29 DIAGNOSIS — L68.0 HIRSUTISM: ICD-10-CM

## 2024-10-29 DIAGNOSIS — R21 FACIAL RASH: ICD-10-CM

## 2024-10-29 DIAGNOSIS — L65.9 THINNING HAIR: ICD-10-CM

## 2024-10-29 DIAGNOSIS — R53.82 CHRONIC FATIGUE, UNSPECIFIED: Primary | ICD-10-CM

## 2024-10-29 DIAGNOSIS — H04.123 DRY EYES: ICD-10-CM

## 2024-10-29 LAB
25(OH)D3+25(OH)D2 SERPL-MCNC: 41.9 NG/ML (ref 30–100)
ALBUMIN SERPL-MCNC: 4.3 G/DL (ref 3.5–5.2)
ALBUMIN/GLOB SERPL: 2 G/DL
ALP SERPL-CCNC: 65 U/L (ref 39–117)
ALT SERPL-CCNC: 17 U/L (ref 1–33)
AST SERPL-CCNC: 13 U/L (ref 1–32)
BASOPHILS # BLD AUTO: 0.04 10*3/MM3 (ref 0–0.2)
BASOPHILS NFR BLD AUTO: 1 % (ref 0–1.5)
BILIRUB SERPL-MCNC: 0.9 MG/DL (ref 0–1.2)
BUN SERPL-MCNC: 11 MG/DL (ref 6–20)
BUN/CREAT SERPL: 11.7 (ref 7–25)
CALCIUM SERPL-MCNC: 9.4 MG/DL (ref 8.6–10.5)
CENTROMERE B AB SER-ACNC: <0.2 AI (ref 0–0.9)
CHLORIDE SERPL-SCNC: 105 MMOL/L (ref 98–107)
CHROMATIN AB SERPL-ACNC: <0.2 AI (ref 0–0.9)
CO2 SERPL-SCNC: 25.3 MMOL/L (ref 22–29)
CREAT SERPL-MCNC: 0.94 MG/DL (ref 0.57–1)
CRP SERPL-MCNC: <0.3 MG/DL (ref 0–0.5)
DSDNA AB SER-ACNC: <1 IU/ML (ref 0–9)
EGFRCR SERPLBLD CKD-EPI 2021: 77.4 ML/MIN/1.73
ENA JO1 AB SER-ACNC: <0.2 AI (ref 0–0.9)
ENA RNP AB SER-ACNC: <0.2 AI (ref 0–0.9)
ENA SCL70 AB SER-ACNC: <0.2 AI (ref 0–0.9)
ENA SM AB SER-ACNC: <0.2 AI (ref 0–0.9)
ENA SS-A AB SER-ACNC: <0.2 AI (ref 0–0.9)
ENA SS-B AB SER-ACNC: <0.2 AI (ref 0–0.9)
EOSINOPHIL # BLD AUTO: 0.09 10*3/MM3 (ref 0–0.4)
EOSINOPHIL NFR BLD AUTO: 2.3 % (ref 0.3–6.2)
ERYTHROCYTE [DISTWIDTH] IN BLOOD BY AUTOMATED COUNT: 12 % (ref 12.3–15.4)
ERYTHROCYTE [SEDIMENTATION RATE] IN BLOOD BY WESTERGREN METHOD: <1 MM/HR (ref 0–20)
GLOBULIN SER CALC-MCNC: 2.2 GM/DL
GLUCOSE SERPL-MCNC: 94 MG/DL (ref 65–99)
HBA1C MFR BLD: 4.7 % (ref 4.8–5.6)
HCT VFR BLD AUTO: 40.1 % (ref 34–46.6)
HGB BLD-MCNC: 13.4 G/DL (ref 12–15.9)
IMM GRANULOCYTES # BLD AUTO: 0.01 10*3/MM3 (ref 0–0.05)
IMM GRANULOCYTES NFR BLD AUTO: 0.3 % (ref 0–0.5)
LYMPHOCYTES # BLD AUTO: 1.87 10*3/MM3 (ref 0.7–3.1)
LYMPHOCYTES NFR BLD AUTO: 47.1 % (ref 19.6–45.3)
Lab: NORMAL
MCH RBC QN AUTO: 30.8 PG (ref 26.6–33)
MCHC RBC AUTO-ENTMCNC: 33.4 G/DL (ref 31.5–35.7)
MCV RBC AUTO: 92.2 FL (ref 79–97)
MONOCYTES # BLD AUTO: 0.49 10*3/MM3 (ref 0.1–0.9)
MONOCYTES NFR BLD AUTO: 12.3 % (ref 5–12)
NEUTROPHILS # BLD AUTO: 1.47 10*3/MM3 (ref 1.7–7)
NEUTROPHILS NFR BLD AUTO: 37 % (ref 42.7–76)
NRBC BLD AUTO-RTO: 0 /100 WBC (ref 0–0.2)
PLATELET # BLD AUTO: 328 10*3/MM3 (ref 140–450)
POTASSIUM SERPL-SCNC: 3.9 MMOL/L (ref 3.5–5.2)
PROT SERPL-MCNC: 6.5 G/DL (ref 6–8.5)
RBC # BLD AUTO: 4.35 10*6/MM3 (ref 3.77–5.28)
SODIUM SERPL-SCNC: 138 MMOL/L (ref 136–145)
TSH SERPL DL<=0.005 MIU/L-ACNC: 1.33 UIU/ML (ref 0.27–4.2)
VIT B12 SERPL-MCNC: 350 PG/ML (ref 211–946)
WBC # BLD AUTO: 3.97 10*3/MM3 (ref 3.4–10.8)

## 2024-10-29 NOTE — TELEPHONE ENCOUNTER
"Relay     \"Some of your blood counts were still low, although your white blood cell count was back to a normal range. Continue with your follow up to hematology. \"    \"Your rheumatology labs came back negative. I will place an order to rheumatology for further management. \"    "

## 2024-10-30 ENCOUNTER — PATIENT ROUNDING (BHMG ONLY) (OUTPATIENT)
Dept: INTERNAL MEDICINE | Facility: CLINIC | Age: 44
End: 2024-10-30
Payer: COMMERCIAL

## 2024-11-01 NOTE — TELEPHONE ENCOUNTER
Name: TahirAliica      Relationship: Self      Best Callback Number: [phone]      HUB PROVIDED THE RELAY MESSAGE FROM THE OFFICE      PATIENT: VOICED UNDERSTANDING AND HAS NO FURTHER QUESTIONS AT THIS TIME    ADDITIONAL INFORMATION: PLEASE ADVISE.

## 2024-11-08 ENCOUNTER — TELEPHONE (OUTPATIENT)
Dept: INTERNAL MEDICINE | Facility: CLINIC | Age: 44
End: 2024-11-08

## 2024-11-08 NOTE — TELEPHONE ENCOUNTER
Caller: Alicia Haro    Relationship: Self    Best call back number:     What is the best time to reach you:     Who are you requesting to speak with (clinical staff, provider,  specific staff member):     Do you know the name of the person who called:     What was the call regarding: PATIENT WANTS TO BE CALLED TO DISCUSS HER REFERRAL TO RHEUMATOLOGY     Is it okay if the provider responds through MyChart:

## 2024-12-13 ENCOUNTER — OFFICE VISIT (OUTPATIENT)
Dept: INTERNAL MEDICINE | Facility: CLINIC | Age: 44
End: 2024-12-13
Payer: COMMERCIAL

## 2024-12-13 ENCOUNTER — HOSPITAL ENCOUNTER (OUTPATIENT)
Facility: HOSPITAL | Age: 44
Discharge: HOME | End: 2024-12-13
Payer: COMMERCIAL

## 2024-12-13 VITALS
WEIGHT: 166.8 LBS | HEIGHT: 63 IN | OXYGEN SATURATION: 99 % | HEART RATE: 92 BPM | DIASTOLIC BLOOD PRESSURE: 76 MMHG | SYSTOLIC BLOOD PRESSURE: 112 MMHG | BODY MASS INDEX: 29.55 KG/M2 | TEMPERATURE: 98 F

## 2024-12-13 DIAGNOSIS — J30.2 SEASONAL ALLERGIES: ICD-10-CM

## 2024-12-13 DIAGNOSIS — Z00.00 ROUTINE GENERAL MEDICAL EXAMINATION AT A HEALTH CARE FACILITY: Primary | ICD-10-CM

## 2024-12-13 DIAGNOSIS — H04.123 DRY EYES: Chronic | ICD-10-CM

## 2024-12-13 DIAGNOSIS — R53.82 CHRONIC FATIGUE, UNSPECIFIED: Chronic | ICD-10-CM

## 2024-12-13 DIAGNOSIS — G43.E01 CHRONIC MIGRAINE WITH AURA AND WITH STATUS MIGRAINOSUS, NOT INTRACTABLE: Chronic | ICD-10-CM

## 2024-12-13 DIAGNOSIS — R21 MALAR RASH: ICD-10-CM

## 2024-12-13 DIAGNOSIS — D72.819 LEUKOPENIA, UNSPECIFIED TYPE: ICD-10-CM

## 2024-12-13 DIAGNOSIS — M79.672 LEFT FOOT PAIN: ICD-10-CM

## 2024-12-13 DIAGNOSIS — L68.0 HIRSUTISM: ICD-10-CM

## 2024-12-13 DIAGNOSIS — L65.9 THINNING HAIR: Chronic | ICD-10-CM

## 2024-12-13 DIAGNOSIS — R21 FACIAL RASH: ICD-10-CM

## 2024-12-13 DIAGNOSIS — M25.50 ARTHRALGIA, UNSPECIFIED JOINT: Chronic | ICD-10-CM

## 2024-12-13 DIAGNOSIS — Z12.31 ENCOUNTER FOR SCREENING MAMMOGRAM FOR MALIGNANT NEOPLASM OF BREAST: ICD-10-CM

## 2024-12-13 PROCEDURE — 99396 PREV VISIT EST AGE 40-64: CPT

## 2024-12-13 PROCEDURE — 73630 X-RAY EXAM OF FOOT: CPT

## 2024-12-13 PROCEDURE — 73110 X-RAY EXAM OF WRIST: CPT

## 2024-12-13 PROCEDURE — 1125F AMNT PAIN NOTED PAIN PRSNT: CPT

## 2024-12-13 RX ORDER — MONTELUKAST SODIUM 10 MG/1
10 TABLET ORAL NIGHTLY
Qty: 90 TABLET | Refills: 1 | Status: SHIPPED | OUTPATIENT
Start: 2024-12-13

## 2024-12-13 RX ORDER — RIZATRIPTAN BENZOATE 5 MG/1
5 TABLET ORAL ONCE AS NEEDED
Qty: 15 TABLET | Refills: 2 | Status: SHIPPED | OUTPATIENT
Start: 2024-12-13

## 2024-12-13 NOTE — PROGRESS NOTES
Chief Complaint  Annual Exam, Foot Pain, Migraine, URI, Ear Fullness, Generalized Body Aches, Anxiety, and Depression     Subjective:      History of Present Illness {CC  Problem List  Visit  Diagnosis   Encounters  Notes  Medications  Labs  Result Review Imaging  Media :23}     Alicia Haor presents to [facility] for:      History of Present Illness        The patient is a 48-year-old female who presents for evaluation of left foot pain, migraines, generalized stiffness and aching, respiratory infection, ear fullness, itching, and drainage, anxiety, and health maintenance.    She sustained an injury to her left foot approximately 2 weeks ago due to a heavy object falling on it. Initially, she did not seek medical attention, attributing the discomfort to her neuropathy. However, she has since experienced severe pain, particularly when wearing shoes, and notes a sensation of squeezing. The pain, which was previously intermittent, has become constant. She also reports mild swelling and a bluish discoloration of the foot. Additionally, she experiences pain in 2 specific toes, which she is unable to move, causing her significant anxiety.    She has been experiencing recurrent respiratory infections over the past 2 months, with periods of improvement followed by relapses. After the third episode, she was prescribed antibiotics, which have alleviated most symptoms except for a persistent cough and congestion.    She reports a sensation of fullness in her ears, itching, and constant drainage, with the left ear being more affected.    She has been on Topamax for several years but discontinued it due to a change in her insurance coverage. Since then, she has experienced a resurgence of severe migraines. She was previously on a regimen of 25 mg twice daily. She has also tried propranolol but had to discontinue it due to low blood pressure. Her migraines are daily, although she  may have periods of up to a month without them. This week, she has experienced nausea, vomiting, and vision issues associated with her migraines. She has not tried Imitrex or Maxalt. She recalls feeling fatigued and depressed while on Topamax, but these symptoms have improved since discontinuation.    She has been experiencing generalized stiffness and aching, predominantly in her knees and ankles. She has found meloxicam to be more effective than ibuprofen and has also tried diclofenac, which provided relief.    She has been awaiting a rheumatology consultation, which has been delayed due to insurance issues. She has missed several calls from the cardiologist's office but plans to return their call. She has seen an oncologist who referred her back to this office, stating that they do not suspect any malignancy.    She is sexually active and has no concerns in that regard. She had a colonoscopy 14 years ago, which showed nonspecific colitis. She was supposed to get another one done 2 years later but did not. She was having a lot of GI problems then. She has a family history of breast cancer. Her last mammogram was 2 years ago, and she was advised to have them yearly. She has been experiencing soreness in her right rib and nipple, which she describes as similar to dried-up milk. She had an ultrasound and another unspecified test, both of which were normal. She was told she had fibrous tissue. Her breasts remain sore, particularly the right one. She gets her eyes checked yearly and just got them checked a couple of months ago. She does not want any vaccines, including influenza or COVID-19.    She has been on Wellbutrin, Lamictal, and BuSpar. She has been on Singulair 10 mg for a long time and has trouble with all other allergy medications. She has been on prazosin for sleep.    SOCIAL HISTORY  The patient smokes cigarettes and started at age 36. She is trying to quit.    FAMILY HISTORY  The patient's aunt, her  father's sister, was diagnosed with triple-negative breast cancer about 6 to 8 years ago and is currently around 70 years old. The patient's father also had breast cancer. Both sides of her family have a history of high blood pressure and high cholesterol.    MEDICATIONS  Current: Wellbutrin, Lamictal, BuSpar, Singulair, prazosin  Past: Topamax, propranolol    IMMUNIZATIONS  She declined influenza and COVID-19 vaccines.     Alicia is here for coordination of medical care, to discuss health maintenance, disease prevention as well as to followup on medical problems.     Patient Care Team:  Karina Georges APRN as PCP - General (Nurse Practitioner)     Activity level is heavy.   Exercises 5 per week.      Health and Weight:   Weight trend is   Wt Readings from Last 4 Encounters:   12/13/24 75.7 kg (166 lb 12.8 oz)   10/28/24 73 kg (161 lb)   04/04/24 74.8 kg (165 lb)   01/12/23 74.8 kg (165 lb)       BMI is >= 25 and <30. (Overweight) The following options were offered after discussion;: weight loss educational material (shared in after visit summary), exercise counseling/recommendations, and nutrition counseling/recommendations      Mental Health:   PHQ-2 Depression Screening  Little interest or pleasure in doing things?     Feeling down, depressed, or hopeless?     PHQ-2 Total Score       Health Maintenance Female:  GYN:  total women- 2022  Patient's last mammogram was  Last Completed Mammogram       This patient has no relevant Health Maintenance data.           Advised routine self-breast exams monthly.  Pap smears have been: normal  Postmenopausal: [] Yes   Breast cancer - paternal aunt     STI Screen:   [] HIV     [] Syphilis   [] Chlamydia/ Gonorrhea   [x] Declines STD screen  If tests ordered, patient was informed HIV results will not show up on my chart.     Colon cancer screen:   Patient's last colonoscopy was  2010  Last Completed Colonoscopy       This patient has no relevant Health Maintenance  data.           She was advised to repeat in 2 years.    Vaccines:   [] Flu     [] Tdap   [] Prevnar 20    [] Shingrix (shingles: series of 2)   [] COVID (booster)    []   [x]Declines vaccines      Last eye exam: 2024    Advise regular sunscreen.      Her cardiovascular risks are:     [] No Known risk factors    [] Hypertension   [] Hyperlipidemia  [] Diabetes    [] Obesity  [x] Family history   [x] Current or hx tobacco use  [] Sedentary lifestyle   [] Post-menopausal      I have reviewed patient's medical history, any new submitted information provided by patient or medical assistant and updated medical record.      Objective:      Physical Exam  Vitals reviewed.   Constitutional:       General: She is awake. She is not in acute distress.     Appearance: Normal appearance. She is well-groomed. She is not ill-appearing, toxic-appearing or diaphoretic.   HENT:      Head: Normocephalic.      Right Ear: Hearing normal.      Left Ear: Hearing normal.      Nose: Nose normal.      Mouth/Throat:      Lips: Pink.      Mouth: Mucous membranes are moist.   Eyes:      General: Lids are normal. Vision grossly intact.         Right eye: No foreign body, discharge or hordeolum.         Left eye: No foreign body, discharge or hordeolum.      Extraocular Movements: Extraocular movements intact.      Conjunctiva/sclera: Conjunctivae normal.      Pupils: Pupils are equal, round, and reactive to light. Pupils are equal.   Neck:      Thyroid: No thyroid mass, thyromegaly or thyroid tenderness.      Vascular: No carotid bruit or JVD.      Trachea: Trachea and phonation normal.   Cardiovascular:      Rate and Rhythm: Normal rate and regular rhythm.      Pulses: Normal pulses.      Heart sounds: Normal heart sounds, S1 normal and S2 normal. No murmur heard.     No friction rub. No gallop.   Pulmonary:      Effort: Pulmonary effort is normal. No respiratory distress.      Breath sounds: Normal breath sounds. No stridor, decreased air  movement or transmitted upper airway sounds. No decreased breath sounds, wheezing, rhonchi or rales.   Chest:      Chest wall: No tenderness.   Abdominal:      General: Abdomen is flat. Bowel sounds are normal. There is no distension.      Palpations: Abdomen is soft. There is no mass.      Tenderness: There is no abdominal tenderness. There is no right CVA tenderness, left CVA tenderness, guarding or rebound.      Hernia: No hernia is present.   Musculoskeletal:         General: No swelling, tenderness, deformity or signs of injury. Normal range of motion.      Cervical back: Normal, full passive range of motion without pain and normal range of motion. No rigidity or tenderness.      Thoracic back: Normal.      Lumbar back: Normal.      Right lower leg: No edema.      Left lower leg: No edema.   Lymphadenopathy:      Head:      Right side of head: No submental, submandibular, tonsillar or preauricular adenopathy.      Left side of head: No submental, submandibular, tonsillar or preauricular adenopathy.      Cervical: No cervical adenopathy.   Skin:     General: Skin is warm and dry.      Capillary Refill: Capillary refill takes less than 2 seconds.      Coloration: Skin is not jaundiced or pale.      Findings: No bruising, erythema, lesion or rash.   Neurological:      General: No focal deficit present.      Mental Status: She is alert and oriented to person, place, and time. Mental status is at baseline.      Motor: No weakness.      Gait: Gait normal.   Psychiatric:         Attention and Perception: Attention and perception normal.         Mood and Affect: Mood and affect normal.         Speech: Speech normal.         Behavior: Behavior normal. Behavior is cooperative.         Thought Content: Thought content normal.         Cognition and Memory: Cognition and memory normal.         Judgment: Judgment normal.        Result Review  Data Reviewed:{ Labs  Result Review  Imaging  Med Tab  Media :23}  "    Results  Laboratory Studies  White blood count was low normal. Neutrophils were low. Monocytes were back to normal.                  Vital Signs:   /76 (BP Location: Left arm, Patient Position: Sitting, Cuff Size: Adult)   Pulse 92   Temp 98 °F (36.7 °C) (Oral)   Ht 160 cm (63\")   Wt 75.7 kg (166 lb 12.8 oz)   SpO2 99%   BMI 29.55 kg/m²                 Requested Prescriptions     Signed Prescriptions Disp Refills    montelukast (Singulair) 10 MG tablet 90 tablet 1     Sig: Take 1 tablet by mouth Every Night.    rizatriptan (MAXALT) 5 MG tablet 15 tablet 2     Sig: Take 1 tablet by mouth 1 (One) Time As Needed for Migraine for up to 1 dose. May repeat in 2 hours if needed    diclofenac (VOLTAREN) 50 MG EC tablet 60 tablet 1     Sig: Take 1 tablet by mouth 2 (Two) Times a Day As Needed (arthralgia).       Routine medications provided by this office will also be refilled via pharmacy request.       Current Outpatient Medications:     amphetamine-dextroamphetamine (ADDERALL) 20 MG tablet, Take 1 tablet by mouth Daily., Disp: , Rfl:     amphetamine-dextroamphetamine XR (Adderall XR) 20 MG 24 hr capsule, Take 25 mg by mouth Every Morning, Disp: , Rfl:     buPROPion SR (WELLBUTRIN SR) 150 MG 12 hr tablet, Take 1 tablet by mouth 2 (Two) Times a Day., Disp: , Rfl:     busPIRone (BUSPAR) 10 MG tablet, Take 1 tablet by mouth 3 (Three) Times a Day., Disp: , Rfl:     lamoTRIgine (LaMICtal) 25 MG tablet, , Disp: , Rfl:     prazosin (MINIPRESS) 1 MG capsule, Take  by mouth Every Night. Unknown dose, Disp: , Rfl:     diclofenac (VOLTAREN) 50 MG EC tablet, Take 1 tablet by mouth 2 (Two) Times a Day As Needed (arthralgia)., Disp: 60 tablet, Rfl: 1    montelukast (Singulair) 10 MG tablet, Take 1 tablet by mouth Every Night., Disp: 90 tablet, Rfl: 1    rizatriptan (MAXALT) 5 MG tablet, Take 1 tablet by mouth 1 (One) Time As Needed for Migraine for up to 1 dose. May repeat in 2 hours if needed, Disp: 15 tablet, Rfl: " 2     Assessment and Plan:      Assessment and Plan {CC Problem List  Visit Diagnosis  ROS  Review (Popup)  [facility] Maintenance  Quality  BestPractice  Medications  SmartSets  SnapShot Encounters  Media :23}     Problem List Items Addressed This Visit       Chronic fatigue, unspecified    Relevant Orders    Ambulatory Referral to Rheumatology    Hirsutism    Seasonal allergies    Relevant Medications    montelukast (Singulair) 10 MG tablet    Arthralgia    Relevant Medications    diclofenac (VOLTAREN) 50 MG EC tablet    Other Relevant Orders    Ambulatory Referral to Rheumatology    Chronic migraine with aura and with status migrainosus, not intractable (Chronic)    Relevant Medications    rizatriptan (MAXALT) 5 MG tablet    diclofenac (VOLTAREN) 50 MG EC tablet    Thinning hair (Chronic)    Relevant Orders    Ambulatory Referral to Rheumatology    Dry eyes (Chronic)    Relevant Orders    Ambulatory Referral to Rheumatology     Other Visit Diagnoses       Routine general medical examination at a health care facility    -  Primary    Left foot pain        Relevant Orders    XR Foot 3+ View Left (Completed)    Encounter for screening mammogram for malignant neoplasm of breast        Relevant Orders    Mammo Screening Digital Tomosynthesis Bilateral With CAD    Facial rash        Relevant Orders    Ambulatory Referral to Rheumatology    Leukopenia, unspecified type        Relevant Medications    diclofenac (VOLTAREN) 50 MG EC tablet    Other Relevant Orders    CBC w AUTO Differential    Malar rash        Relevant Orders    Ambulatory Referral to Rheumatology               1. Left foot pain.  An x-ray of the left foot will be ordered to rule out any fractures or other abnormalities. Diclofenac 50 mg will be prescribed, to be taken twice daily as needed for pain management.    2. Migraines.  A prescription for Maxalt (rizatriptan) will be provided, with instructions to take one tablet orally as needed for  migraines, and an additional tablet after 2 hours if necessary. If Maxalt is not effective, alternative medications such as Nurtec or Ubrelvy will be considered.    3. Generalized stiffness and aching.  Diclofenac 50 mg will be prescribed, to be taken twice daily as needed for pain management.    4. Respiratory infection.  Singulair 10 mg will be prescribed to help manage symptoms.    5. Ear fullness, itching, and drainage.  She is advised to allow water to run into her ears during showers for a month to help clear out earwax and hair, which may alleviate itchiness.    6. Anxiety.  Refills for Wellbutrin, Lamictal, and BuSpar will be provided.    7. Health maintenance.  A referral to a rheumatologist will be made. A CBC will be ordered to monitor her white blood cell count. An order for a mammogram will be placed.    PROCEDURE  The patient had a colonoscopy 14 years ago, which showed nonspecific colitis.     Patient verbalizes understanding and is comfortable with the plan of care.      Follow Up {Instructions Charge Capture  Follow-up Communications :23}     Return in about 6 months (around 6/13/2025) for Recheck.      Patient was given instructions and counseling regarding her condition or for health maintenance advice. Please see specific information pulled into the AVS if appropriate.    Dragon disclaimer:   Much of this encounter note is an electronic transcription/translation of spoken language to printed text. The electronic translation of spoken language may permit erroneous, or at times, nonsensical words or phrases to be inadvertently transcribed; Although I have reviewed the note for such errors, some may still exist.         Additional Patient Counseling:       Patient Instructions   Preventive Care 21-39 Years Old, Female  Preventive care refers to lifestyle choices and visits with your health care provider that can promote health and wellness. Preventive care visits are also called wellness  exams.  What can I expect for my preventive care visit?  Counseling  During your preventive care visit, your health care provider may ask about your:  Medical history, including:  Past medical problems.  Family medical history.  Pregnancy history.  Current health, including:  Menstrual cycle.  Method of birth control.  Emotional well-being.  Home life and relationship well-being.  Sexual activity and sexual health.  Lifestyle, including:  Alcohol, nicotine or tobacco, and drug use.  Access to firearms.  Diet, exercise, and sleep habits.  Work and work environment.  Sunscreen use.  Safety issues such as seatbelt and bike helmet use.  Physical exam  Your health care provider may check your:  Height and weight. These may be used to calculate your BMI (body mass index). BMI is a measurement that tells if you are at a healthy weight.  Waist circumference. This measures the distance around your waistline. This measurement also tells if you are at a healthy weight and may help predict your risk of certain diseases, such as type 2 diabetes and high blood pressure.  Heart rate and blood pressure.  Body temperature.  Skin for abnormal spots.  What immunizations do I need?    Vaccines are usually given at various ages, according to a schedule. Your health care provider will recommend vaccines for you based on your age, medical history, and lifestyle or other factors, such as travel or where you work.  What tests do I need?  Screening  Your health care provider may recommend screening tests for certain conditions. This may include:  Pelvic exam and Pap test.  Lipid and cholesterol levels.  Diabetes screening. This is done by checking your blood sugar (glucose) after you have not eaten for a while (fasting).  Hepatitis B test.  Hepatitis C test.  HIV (human immunodeficiency virus) test.  STI (sexually transmitted infection) testing, if you are at risk.  BRCA-related cancer screening. This may be done if you have a family history  of breast, ovarian, tubal, or peritoneal cancers.  Talk with your health care provider about your test results, treatment options, and if necessary, the need for more tests.  Follow these instructions at home:  Eating and drinking    Eat a healthy diet that includes fresh fruits and vegetables, whole grains, lean protein, and low-fat dairy products.  Take vitamin and mineral supplements as recommended by your health care provider.  Do not drink alcohol if:  Your health care provider tells you not to drink.  You are pregnant, may be pregnant, or are planning to become pregnant.  If you drink alcohol:  Limit how much you have to 0-1 drink a day.  Know how much alcohol is in your drink. In the [place] one drink equals one 12 oz bottle of beer (355 mL), one 5 oz glass of wine (148 mL), or one 1½ oz glass of hard liquor (44 mL).  Lifestyle  Brush your teeth every morning and night with fluoride toothpaste. Floss one time each day.  Exercise for at least 30 minutes 5 or more days each week.  Do not use any products that contain nicotine or tobacco. These products include cigarettes, chewing tobacco, and vaping devices, such as e-cigarettes. If you need help quitting, ask your health care provider.  Do not use drugs.  If you are sexually active, practice safe sex. Use a condom or other form of protection to prevent STIs.  If you do not wish to become pregnant, use a form of birth control. If you plan to become pregnant, see your health care provider for a prepregnancy visit.  Find healthy ways to manage stress, such as:  Meditation, yoga, or listening to music.  Journaling.  Talking to a trusted person.  Spending time with friends and family.  Minimize exposure to UV radiation to reduce your risk of skin cancer.  Safety  Always wear your seat belt while driving or riding in a vehicle.  Do not drive:  If you have been drinking alcohol. Do not ride with someone who has been drinking.  If you have been using any mind-altering  substances or drugs.  While texting.  When you are tired or distracted.  Wear a helmet and other protective equipment during sports activities.  If you have firearms in your house, make sure you follow all gun safety procedures.  Seek help if you have been physically or sexually abused.  What's next?  Go to your health care provider once a year for an annual wellness visit.  Ask your health care provider how often you should have your eyes and teeth checked.  Stay up to date on all vaccines.  This information is not intended to replace advice given to you by your health care provider. Make sure you discuss any questions you have with your health care provider.  Document Revised: 06/15/2022 Document Reviewed: 06/15/2022  [employer] Patient Education © 2024 [employer]     Patient or patient representative verbalized consent for the use of Ambient Listening during the visit with  SEGUNDO Yu for chart documentation. 1/10/2025  10:11 EST

## 2025-01-10 PROBLEM — H04.123 DRY EYES: Chronic | Status: ACTIVE | Noted: 2025-01-10

## 2025-01-10 PROBLEM — G43.E01 CHRONIC MIGRAINE WITH AURA AND WITH STATUS MIGRAINOSUS, NOT INTRACTABLE: Chronic | Status: ACTIVE | Noted: 2025-01-10

## 2025-01-10 PROBLEM — L65.9 THINNING HAIR: Chronic | Status: ACTIVE | Noted: 2025-01-10

## 2025-01-10 NOTE — PATIENT INSTRUCTIONS
Preventive Care 21-39 Years Old, Female  Preventive care refers to lifestyle choices and visits with your health care provider that can promote health and wellness. Preventive care visits are also called wellness exams.  What can I expect for my preventive care visit?  Counseling  During your preventive care visit, your health care provider may ask about your:  Medical history, including:  Past medical problems.  Family medical history.  Pregnancy history.  Current health, including:  Menstrual cycle.  Method of birth control.  Emotional well-being.  Home life and relationship well-being.  Sexual activity and sexual health.  Lifestyle, including:  Alcohol, nicotine or tobacco, and drug use.  Access to firearms.  Diet, exercise, and sleep habits.  Work and work environment.  Sunscreen use.  Safety issues such as seatbelt and bike helmet use.  Physical exam  Your health care provider may check your:  Height and weight. These may be used to calculate your BMI (body mass index). BMI is a measurement that tells if you are at a healthy weight.  Waist circumference. This measures the distance around your waistline. This measurement also tells if you are at a healthy weight and may help predict your risk of certain diseases, such as type 2 diabetes and high blood pressure.  Heart rate and blood pressure.  Body temperature.  Skin for abnormal spots.  What immunizations do I need?    Vaccines are usually given at various ages, according to a schedule. Your health care provider will recommend vaccines for you based on your age, medical history, and lifestyle or other factors, such as travel or where you work.  What tests do I need?  Screening  Your health care provider may recommend screening tests for certain conditions. This may include:  Pelvic exam and Pap test.  Lipid and cholesterol levels.  Diabetes screening. This is done by checking your blood sugar (glucose) after you have not eaten for a while (fasting).  Hepatitis  B test.  Hepatitis C test.  HIV (human immunodeficiency virus) test.  STI (sexually transmitted infection) testing, if you are at risk.  BRCA-related cancer screening. This may be done if you have a family history of breast, ovarian, tubal, or peritoneal cancers.  Talk with your health care provider about your test results, treatment options, and if necessary, the need for more tests.  Follow these instructions at home:  Eating and drinking    Eat a healthy diet that includes fresh fruits and vegetables, whole grains, lean protein, and low-fat dairy products.  Take vitamin and mineral supplements as recommended by your health care provider.  Do not drink alcohol if:  Your health care provider tells you not to drink.  You are pregnant, may be pregnant, or are planning to become pregnant.  If you drink alcohol:  Limit how much you have to 0-1 drink a day.  Know how much alcohol is in your drink. In the [place] one drink equals one 12 oz bottle of beer (355 mL), one 5 oz glass of wine (148 mL), or one 1½ oz glass of hard liquor (44 mL).  Lifestyle  Brush your teeth every morning and night with fluoride toothpaste. Floss one time each day.  Exercise for at least 30 minutes 5 or more days each week.  Do not use any products that contain nicotine or tobacco. These products include cigarettes, chewing tobacco, and vaping devices, such as e-cigarettes. If you need help quitting, ask your health care provider.  Do not use drugs.  If you are sexually active, practice safe sex. Use a condom or other form of protection to prevent STIs.  If you do not wish to become pregnant, use a form of birth control. If you plan to become pregnant, see your health care provider for a prepregnancy visit.  Find healthy ways to manage stress, such as:  Meditation, yoga, or listening to music.  Journaling.  Talking to a trusted person.  Spending time with friends and family.  Minimize exposure to UV radiation to reduce your risk of skin  cancer.  Safety  Always wear your seat belt while driving or riding in a vehicle.  Do not drive:  If you have been drinking alcohol. Do not ride with someone who has been drinking.  If you have been using any mind-altering substances or drugs.  While texting.  When you are tired or distracted.  Wear a helmet and other protective equipment during sports activities.  If you have firearms in your house, make sure you follow all gun safety procedures.  Seek help if you have been physically or sexually abused.  What's next?  Go to your health care provider once a year for an annual wellness visit.  Ask your health care provider how often you should have your eyes and teeth checked.  Stay up to date on all vaccines.  This information is not intended to replace advice given to you by your health care provider. Make sure you discuss any questions you have with your health care provider.  Document Revised: 06/15/2022 Document Reviewed: 06/15/2022  Elsevier Patient Education © 2024 [employer]

## 2025-01-21 ENCOUNTER — OFFICE VISIT (OUTPATIENT)
Age: 45
End: 2025-01-21
Payer: COMMERCIAL

## 2025-01-21 VITALS
BODY MASS INDEX: 29.2 KG/M2 | TEMPERATURE: 98.2 F | HEIGHT: 63 IN | OXYGEN SATURATION: 98 % | SYSTOLIC BLOOD PRESSURE: 100 MMHG | HEART RATE: 82 BPM | DIASTOLIC BLOOD PRESSURE: 60 MMHG | WEIGHT: 164.8 LBS

## 2025-01-21 DIAGNOSIS — M79.7 FIBROMYALGIA: Primary | ICD-10-CM

## 2025-01-21 DIAGNOSIS — Z72.0 TOBACCO USE: ICD-10-CM

## 2025-01-21 DIAGNOSIS — R53.83 FATIGUE, UNSPECIFIED TYPE: ICD-10-CM

## 2025-01-21 DIAGNOSIS — D70.9 NEUTROPENIA, UNSPECIFIED TYPE: ICD-10-CM

## 2025-01-21 DIAGNOSIS — G43.E01 CHRONIC MIGRAINE WITH AURA AND WITH STATUS MIGRAINOSUS, NOT INTRACTABLE: Chronic | ICD-10-CM

## 2025-01-21 NOTE — PROGRESS NOTES
RHEUMATOLOGY NEW PATIENT VISIT  2025  Patient Name: Alicia Haro : 1980 Medical Record: [id number]  PCP: Karina Georges APRN    Referring provider: Karina Georges  REASON FOR CONSULTATION    Chronic fatigue, dry eyes, rash.    History of Present Illness  Alicia Haro is a 44 y.o. female who presents for evaluation of chronic fatigue, dry eyes, rash    She was referred to Rheumatology due to multiple complaints and need to rule out an autoimmune etiology.  I reviewed her prior history and labs and note that she had several negative autoimmune workup.  Today's evaluation does not suggest clinical features or concern for lupus or Sjogren's or other connective tissue disease.    She describes chronic symptoms of:  -Hair loss, severe pain, and morning stiffness, which intensify as the day progresses.    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     She has a history of smoking, consuming approximately one pack per day since the age of 36, and uses marijuana a few times a week.    She has a 13-year-old daughter and has experienced 3 to 4 miscarriages post-pregnancy. She has a history of fertility issues and was informed that she could not conceive, but she became pregnant at the age of 30. S    She suspects she may have lupus, but several tests have returned negative results. She has noticed abnormalities in her neutrophils and monocytes over the past year or two, and her white blood cell count has decreased in recent tests, leading to frequent illnesses.   She has tested negative for Sjogren's syndrome.   Her symptoms have caused her to miss work and lose several jobs.   She experiences dry mouth and has a history of painful mouth ulcers.   She has not  been diagnosed with Raynaud's disease but experiences significant pain in cold weather   She has a history of interstitial cystitis and recurrent urinary tract infections.   She has been diagnosed with nonspecific colitis and irritable bowel syndrome, and experiences alternating diarrhea and constipation.   She experiences muscle fatigue and cramping, even during simple tasks like brushing her hair.    Of note, she has been diagnosed with chronic fatigue syndrome and was on gabapentin for about 12 years, but discontinued it due to increased fatigue.    SOCIAL HISTORY  The patient smokes close to a pack a day and is trying to quit. She uses marijuana a couple of times a week. She does not drink alcohol. She is a nurse in long term care rehab.    FAMILY HISTORY  The patient's mother has Graves' disease and had her thyroid eradicated.    MEDICATIONS  Current: Adderall, bupropion, buspirone, lamotrigine, montelukast, prazosin, Maxalt, diclofenac    Results  2024  White blood cell-3.59-ANC-1.34  10/28/2024:  CRP and ESR-within normal  FARRUKH and PRIYA panel neg , TSH wnl, A1c 4.7, CMP wnl  CBC-WBCs 3.97, low ANC-1.47,  Historical-2024  Celiac panel negative, Lyme antibody  wnl, uric acid wnl  2024:  Hepatitis panel-negative, syphilis negative, 2024-  CCP and RF negative, FARRUKH negative, ESR and CRP wnll  Pert Imagin2024 x-ray left foot-normal   Corticated ossific focus at the ulnar styloid may be sequela of old injury.Laboratory Studies  Neutrophils and monocytes were off. White blood cells dropped. Thyroid antibodies are not positive. MCV was low.     Current Outpatient Medications:     amphetamine-dextroamphetamine (ADDERALL) 20 MG tablet, Take 1 tablet by mouth Daily., Disp: , Rfl:     amphetamine-dextroamphetamine XR (Adderall XR) 20 MG 24 hr capsule, Take 25 mg by mouth Every Morning, Disp: , Rfl:     busPIRone (BUSPAR) 10 MG tablet, Take 1 tablet by mouth 3 (Three) Times a Day., Disp: , Rfl:  "    diclofenac (VOLTAREN) 50 MG EC tablet, Take 1 tablet by mouth 2 (Two) Times a Day As Needed (arthralgia)., Disp: 60 tablet, Rfl: 1    montelukast (Singulair) 10 MG tablet, Take 1 tablet by mouth Every Night., Disp: 90 tablet, Rfl: 1    prazosin (MINIPRESS) 1 MG capsule, Take  by mouth Every Night. Unknown dose, Disp: , Rfl:     buPROPion SR (WELLBUTRIN SR) 150 MG 12 hr tablet, Take 1 tablet by mouth 2 (Two) Times a Day., Disp: 60 tablet, Rfl: 6    lamoTRIgine (LaMICtal) 25 MG tablet, Take 1 tablet by mouth Daily., Disp: 90 tablet, Rfl: 0    rizatriptan (MAXALT) 5 MG tablet, Take 1 tablet by mouth 1 (One) Time As Needed for Migraine for up to 1 dose. May repeat in 2 hours if needed, Disp: 15 tablet, Rfl: 2     There are no discontinued medications.     Past Medical History:   Diagnosis Date    ADHD     Anxiety     Asthma     Bipolar 2 disorder     Depression     IBS (irritable colon syndrome)     PCOS (polycystic ovarian syndrome)        Immunization History   Administered Date(s) Administered    COVID-19 (PFIZER) Purple Cap Monovalent 11/27/2021    Covid-19 (Pfizer) Gray Cap Monovalent 01/15/2022      Family History   Problem Relation Age of Onset    Thyroid disease Mother     Heart failure Mother     Diabetes Mother     Aneurysm Mother     Hypertension Father     Diabetes Father      Allergies   Allergen Reactions    Amoxicillin-Pot Clavulanate Nausea And Vomiting    Latex Rash      Physical Exam       Vitals:    01/21/25 1254   BP: 100/60   BP Location: Left arm   Patient Position: Sitting   Cuff Size: Adult   Pulse: 82   Temp: 98.2 °F (36.8 °C)   TempSrc: Oral   SpO2: 98%   Weight: 74.8 kg (164 lb 12.8 oz)   Height: 160 cm (62.99\")     Gen: Well-developed, well-nourished adult in no apparent distress. Pleasant and cooperative. Alert and oriented.   HEENT: EOMI, MMM, oropharynx clear without oral ulcers, no lacrimal gland enlargement, normal salivary pooling, normal temporal arteries without tenderness or " beading.  Chest: Normal work of breathing. CTAB without wheezing/rhonchi/rales. ??  CV: RRR, normal S1/S2, no murmurs/rubs/gallops heard. ??  Extremities: Warm, 2+ distal pulses, no cyanosis, clubbing, or edema.  Neuro: Good comprehension/cognition. Muscle strength 5/5 in all extremities. Gait normal.??  ??Skin: No alopecia, nail change (including no nail pitting), rashes, bruising, petechiae, sclerodactyly, digital pits, telangiectasias, tophi, appreciable calcinosis, or nodules.??    Comprehensive Musculoskeletal Examination:?  - Jaw, neck without limited ROM.?  - Shoulders, elbows, wrists, hands/fingers, knees, ankles, feet/toes: No deformity, erythema, warmth, swelling, effusion, tenderness, or limited ROM.??  - Lumbosacral spine and hips without limited ROM.?? Negative JARED    LABS AND IMAGING ll laboratory data was reviewed and relevant values are noted as below.  See lab comment in HPI  Assessment & Plan  Alicia Haro is a 44 y.o. female who is being seen for evaluation of multiple complaints and need to rule out an autoimmune etiology    Patient's symptoms,  including severe pain, stiffness, and fatigue, align with fibromyalgia. She reports worsening symptoms, including severe migraines and heat intolerance.   Physical examination revealed tenderness in multiple areas, but no significant swelling or inflammation. She has a history of chronic fatigue syndrome, which shares similar symptoms.     - She is advised to engage in physical therapy and cognitive behavioral therapy to manage symptoms.    - Proper sleep hygiene and a balanced diet are recommended to help alleviate symptoms.  - Patient's primary care provider will coordinate these referrals and monitoring for improvement.    Her white blood cell count has been consistently low, which may be medication-induced,possibly related to bupropion.   She follows with oncology for this problem and current investigations to date is not indicative of ongoing  hematological issue.  This has led to recurrent infections and increased susceptibility to illness.   - She is advised to monitor her white blood cell count regularly and avoid exposure to sick individuals.  - If her counts continue to be low she can discuss with her prescribing physician to see if there is an alternative medication.    She smokes daily tobacco for many years and understands the risk to her overall health.  She is willing to quit but  it has been tough doing so.  -She has been advised to work with her primary care provider to explore options for tobacco cessation    Diagnoses and all orders for this visit:    1. Fibromyalgia (Primary)    2. Fatigue, unspecified type    3. Neutropenia, unspecified type    4. Tobacco use         Patient or patient representative verbalized consent for the use of Ambient Listening during the visit with  Raffy Bullock MD for chart documentation.      I spent 60 minutes caring for Alicia on this date of service. This time includes time spent by me in the following activities:preparing for the visit, reviewing tests, obtaining and/or reviewing a separately obtained history, performing a medically appropriate examination and/or evaluation , counseling and educating the patient/family/caregiver, documenting information in the medical record, independently interpreting results and communicating that information with the patient/family/caregiver, and care coordination

## 2025-01-24 RX ORDER — LAMOTRIGINE 25 MG/1
25 TABLET ORAL DAILY
Qty: 90 TABLET | Refills: 0 | Status: SHIPPED | OUTPATIENT
Start: 2025-01-24

## 2025-01-24 RX ORDER — BUPROPION HYDROCHLORIDE 150 MG/1
150 TABLET, EXTENDED RELEASE ORAL 2 TIMES DAILY
Qty: 60 TABLET | Refills: 6 | Status: SHIPPED | OUTPATIENT
Start: 2025-01-24

## 2025-01-24 RX ORDER — RIZATRIPTAN BENZOATE 5 MG/1
5 TABLET ORAL ONCE AS NEEDED
Qty: 15 TABLET | Refills: 2 | Status: SHIPPED | OUTPATIENT
Start: 2025-01-24

## 2025-01-24 RX ORDER — BUPROPION HYDROCHLORIDE 150 MG/1
150 TABLET, EXTENDED RELEASE ORAL 2 TIMES DAILY
OUTPATIENT
Start: 2025-01-24

## 2025-01-24 RX ORDER — LAMOTRIGINE 25 MG/1
TABLET ORAL
OUTPATIENT
Start: 2025-01-24

## 2025-01-24 NOTE — TELEPHONE ENCOUNTER
Rx Refill Note  Requested Prescriptions     Pending Prescriptions Disp Refills    buPROPion SR (WELLBUTRIN SR) 150 MG 12 hr tablet 60 tablet 6     Sig: Take 1 tablet by mouth 2 (Two) Times a Day.    lamoTRIgine (LaMICtal) 25 MG tablet      rizatriptan (MAXALT) 5 MG tablet 15 tablet 2     Sig: Take 1 tablet by mouth 1 (One) Time As Needed for Migraine for up to 1 dose. May repeat in 2 hours if needed      Last office visit with prescribing clinician: 12/13/2024   Last telemedicine visit with prescribing clinician: Visit date not found   Next office visit with prescribing clinician: 6/16/2025

## 2025-01-24 NOTE — TELEPHONE ENCOUNTER
Caller: Alicia Haro    Relationship: Self    Best call back number: [phone]    Requested Prescriptions:   Requested Prescriptions     Pending Prescriptions Disp Refills    lamoTRIgine (LaMICtal) 25 MG tablet      buPROPion SR (WELLBUTRIN SR) 150 MG 12 hr tablet       Sig: Take 1 tablet by mouth 2 (Two) Times a Day.        Pharmacy where request should be sent: [facility] [id number] [facility] [address] [facility] [phone] [facility] [phone] FX     Last office visit with prescribing clinician: 12/13/2024   Last telemedicine visit with prescribing clinician: Visit date not found   Next office visit with prescribing clinician: 6/16/2025     Additional details provided by patient: PATIENT IS NEEDING REFILL ON THESE TWO MEDICATIONS     IF THERE IS A PROBLEM PLEASE GIVE CALLBACK PATIENT STATES SHE HAS BEEN TRYING TO GET THESE MEDICATION TWO WEEKS     PATIENT STATES SHE HAS ALREADY HAD LABS COMPLETED         Would you like a call back once the refill request has been completed: [] Yes [x] No    If the office needs to give you a call back, can they leave a voicemail: [] Yes [x] No    Kaylan Otero Rep   01/24/25 10:31 EST

## 2025-01-25 DIAGNOSIS — G43.E01 CHRONIC MIGRAINE WITH AURA AND WITH STATUS MIGRAINOSUS, NOT INTRACTABLE: Chronic | ICD-10-CM

## 2025-01-25 PROBLEM — D70.9 NEUTROPENIA: Status: ACTIVE | Noted: 2025-01-25

## 2025-01-25 PROBLEM — M79.7 FIBROMYALGIA: Status: ACTIVE | Noted: 2025-01-25

## 2025-01-25 PROBLEM — R53.83 FATIGUE: Status: ACTIVE | Noted: 2025-01-25

## 2025-01-25 PROBLEM — Z72.0 TOBACCO USE: Status: ACTIVE | Noted: 2025-01-25

## 2025-01-25 NOTE — PATIENT INSTRUCTIONS
Thank you for your new patient visit today with rheumatology.    You were evaluated for a longstanding history of joint pains, fatigue, dry mouth, creatinine    I reviewed your prior history and labs and note that you have had several negative autoimmune workup.  You have history of chronic fatigue and fibromyalgia but probably inadequate treatment.  Today's review does not suggest clinical features or concern for lupus or Sjogren's or other connective tissue disease.    However, I think you may have ongoing fibromyalgia with a bit of joint hypermobility likely contributing to your symptoms.    I acknowledge your recent leukopenia and easy infection risk, and note that your currently following with oncology.  Your peripheral smear and flow cytometry where normal.  I suspect your leukopenia might be medication induced.    There is no indication for further testing or need for a disease modifying therapy at this time.    Please work with your primary care provider for engagement in physical therapy, cognitive behavioral therapy, lifestyle and dietary changes and improvement of your sleep patterns.    You do not need any longitudinal follow-up with rheumatology.

## 2025-01-28 RX ORDER — RIZATRIPTAN BENZOATE 5 MG/1
5 TABLET ORAL ONCE AS NEEDED
Qty: 15 TABLET | Refills: 2 | OUTPATIENT
Start: 2025-01-28

## 2025-01-28 RX ORDER — BUPROPION HYDROCHLORIDE 150 MG/1
150 TABLET, EXTENDED RELEASE ORAL 2 TIMES DAILY
Qty: 60 TABLET | Refills: 6 | OUTPATIENT
Start: 2025-01-28

## 2025-01-28 RX ORDER — LAMOTRIGINE 25 MG/1
25 TABLET ORAL DAILY
Qty: 90 TABLET | Refills: 0 | OUTPATIENT
Start: 2025-01-28

## 2025-03-27 ENCOUNTER — TELEPHONE (OUTPATIENT)
Dept: INTERNAL MEDICINE | Facility: CLINIC | Age: 45
End: 2025-03-27

## 2025-03-27 DIAGNOSIS — F32.A ANXIETY AND DEPRESSION: ICD-10-CM

## 2025-03-27 DIAGNOSIS — F41.9 ANXIETY AND DEPRESSION: ICD-10-CM

## 2025-03-27 DIAGNOSIS — F31.81 BIPOLAR 2 DISORDER: Primary | ICD-10-CM

## 2025-03-27 RX ORDER — LAMOTRIGINE 25 MG/1
50 TABLET ORAL DAILY
Qty: 180 TABLET | Refills: 3 | Status: CANCELLED | OUTPATIENT
Start: 2025-03-27

## 2025-03-27 RX ORDER — BUPROPION HYDROCHLORIDE 150 MG/1
150 TABLET, EXTENDED RELEASE ORAL 2 TIMES DAILY
Qty: 60 TABLET | Refills: 6 | Status: SHIPPED | OUTPATIENT
Start: 2025-03-27

## 2025-03-27 RX ORDER — LAMOTRIGINE 25 MG/1
50 TABLET ORAL DAILY
Qty: 180 TABLET | Refills: 0 | Status: SHIPPED | OUTPATIENT
Start: 2025-03-27

## 2025-03-27 NOTE — TELEPHONE ENCOUNTER
Caller: Alicia Haro    Relationship: Self    Best call back number: [phone]     What medication are you requesting: lamoTRIgine (LaMICtal)    If a prescription is needed, what is your preferred pharmacy and phone number: [facility] [id number] - [place], [place] - [facility] AT [facility] [address]  - [phone]  - [phone] FX     Additional notes: PATIENT STATES THAT HER DOSAGE WAS RECENTLY INCREASED TO 50 MG, AND SO SHE RAN OUT OF MEDICATION EARLY AND PHARMACY WILL NOT REFILL WITHOUT PRESCRIPTION FOR INCREASED DOSE.

## 2025-03-27 NOTE — TELEPHONE ENCOUNTER
I updated sig, approve if appropriate    Rx Refill Note  Requested Prescriptions     Pending Prescriptions Disp Refills    lamoTRIgine (LaMICtal) 25 MG tablet 180 tablet 0     Sig: Take 2 tablets by mouth Daily.      Last office visit with prescribing clinician: 12/13/2024   Last telemedicine visit with prescribing clinician: Visit date not found   Next office visit with prescribing clinician: 3/27/2025

## 2025-03-27 NOTE — TELEPHONE ENCOUNTER
Caller: Alicia Haro    Relationship: Self    Best call back number: [phone]     Requested Prescriptions:   Requested Prescriptions     Pending Prescriptions Disp Refills    buPROPion SR (WELLBUTRIN SR) 150 MG 12 hr tablet 60 tablet 6     Sig: Take 1 tablet by mouth 2 (Two) Times a Day.        Pharmacy where request should be sent: [facility] [phone] [place] [id number] [facility] [address] [facility] [phone] [facility] [phone]      Last office visit with prescribing clinician: 12/13/2024   Last telemedicine visit with prescribing clinician: Visit date not found   Next office visit with prescribing clinician: 6/16/2025     Would you like a call back once the refill request has been completed: [] Yes [x] No    If the office needs to give you a call back, can they leave a voicemail: [] Yes [x] No    Kaylan Terry Rep   03/27/25 09:40 EDT

## 2025-04-17 ENCOUNTER — TELEPHONE (OUTPATIENT)
Dept: INTERNAL MEDICINE | Facility: CLINIC | Age: 45
End: 2025-04-17
Payer: COMMERCIAL

## 2025-04-17 NOTE — TELEPHONE ENCOUNTER
Voicemail left asking patient to give us a call and let us know if she planned to get her mammogram and overdue labs completed    Hub okay to relay

## 2025-07-28 DIAGNOSIS — F31.81 BIPOLAR 2 DISORDER: ICD-10-CM

## 2025-07-28 DIAGNOSIS — F32.A ANXIETY AND DEPRESSION: ICD-10-CM

## 2025-07-28 DIAGNOSIS — F41.9 ANXIETY AND DEPRESSION: ICD-10-CM

## 2025-07-29 RX ORDER — LAMOTRIGINE 25 MG/1
50 TABLET ORAL DAILY
Qty: 180 TABLET | Refills: 0 | Status: SHIPPED | OUTPATIENT
Start: 2025-07-29